# Patient Record
Sex: MALE | Race: WHITE | Employment: OTHER | URBAN - METROPOLITAN AREA
[De-identification: names, ages, dates, MRNs, and addresses within clinical notes are randomized per-mention and may not be internally consistent; named-entity substitution may affect disease eponyms.]

---

## 2017-01-01 ENCOUNTER — OFFICE VISIT (OUTPATIENT)
Dept: OTOLARYNGOLOGY | Facility: CLINIC | Age: 82
End: 2017-01-01

## 2017-01-01 ENCOUNTER — MED REC SCAN ONLY (OUTPATIENT)
Dept: INTERNAL MEDICINE CLINIC | Facility: CLINIC | Age: 82
End: 2017-01-01

## 2017-01-01 ENCOUNTER — OFFICE VISIT (OUTPATIENT)
Dept: INTERNAL MEDICINE CLINIC | Facility: CLINIC | Age: 82
End: 2017-01-01

## 2017-01-01 VITALS
SYSTOLIC BLOOD PRESSURE: 133 MMHG | WEIGHT: 135 LBS | HEIGHT: 66 IN | BODY MASS INDEX: 21.69 KG/M2 | DIASTOLIC BLOOD PRESSURE: 68 MMHG | TEMPERATURE: 97 F

## 2017-01-01 VITALS
BODY MASS INDEX: 20.25 KG/M2 | HEART RATE: 76 BPM | RESPIRATION RATE: 16 BRPM | WEIGHT: 126 LBS | SYSTOLIC BLOOD PRESSURE: 136 MMHG | HEIGHT: 66 IN | DIASTOLIC BLOOD PRESSURE: 88 MMHG

## 2017-01-01 DIAGNOSIS — R63.4 WEIGHT LOSS OF MORE THAN 10% BODY WEIGHT: ICD-10-CM

## 2017-01-01 DIAGNOSIS — H61.23 BILATERAL IMPACTED CERUMEN: Primary | ICD-10-CM

## 2017-01-01 DIAGNOSIS — E55.9 VITAMIN D DEFICIENCY: ICD-10-CM

## 2017-01-01 DIAGNOSIS — I10 ESSENTIAL HYPERTENSION WITH GOAL BLOOD PRESSURE LESS THAN 140/90: Primary | ICD-10-CM

## 2017-01-01 PROCEDURE — 99214 OFFICE O/P EST MOD 30 MIN: CPT | Performed by: INTERNAL MEDICINE

## 2017-01-01 PROCEDURE — G0463 HOSPITAL OUTPT CLINIC VISIT: HCPCS | Performed by: INTERNAL MEDICINE

## 2017-01-01 PROCEDURE — 69210 REMOVE IMPACTED EAR WAX UNI: CPT | Performed by: OTOLARYNGOLOGY

## 2017-01-01 PROCEDURE — 99213 OFFICE O/P EST LOW 20 MIN: CPT | Performed by: OTOLARYNGOLOGY

## 2017-01-01 RX ORDER — HYDROCHLOROTHIAZIDE 25 MG/1
TABLET ORAL
Qty: 90 TABLET | Refills: 0 | Status: SHIPPED | OUTPATIENT
Start: 2017-01-01 | End: 2017-01-01

## 2017-01-01 RX ORDER — HYDROCHLOROTHIAZIDE 25 MG/1
TABLET ORAL
Qty: 90 TABLET | Refills: 3 | Status: SHIPPED | OUTPATIENT
Start: 2017-01-01

## 2017-02-13 ENCOUNTER — OFFICE VISIT (OUTPATIENT)
Dept: OTOLARYNGOLOGY | Facility: CLINIC | Age: 82
End: 2017-02-13

## 2017-02-13 VITALS
TEMPERATURE: 97 F | WEIGHT: 139 LBS | DIASTOLIC BLOOD PRESSURE: 64 MMHG | HEIGHT: 66 IN | BODY MASS INDEX: 22.34 KG/M2 | SYSTOLIC BLOOD PRESSURE: 128 MMHG

## 2017-02-13 DIAGNOSIS — H61.23 BILATERAL IMPACTED CERUMEN: Primary | ICD-10-CM

## 2017-02-13 PROCEDURE — 69210 REMOVE IMPACTED EAR WAX UNI: CPT | Performed by: OTOLARYNGOLOGY

## 2017-02-13 PROCEDURE — 99213 OFFICE O/P EST LOW 20 MIN: CPT | Performed by: OTOLARYNGOLOGY

## 2017-02-13 NOTE — PROGRESS NOTES
Dara Kennedy is a 80year old male. Patient presents with:  Ear Problem: here for ear cleaning        HISTORY OF PRESENT ILLNESS    11/16/15 Here for evaluation ofbilathearing loss.  Patient feels this has worsened over the las weeks and  is not  associated w CARPAL TUNNEL RELEASE Right 2000    COLONOSCOPY & POLYPECTOMY      ELECTROCARDIOGRAM, COMPLETE  10-    Comment SCANNED TO MEDIA TAB: 10-    HEMORRHOIDECTOMY  1977    TONSILLECTOMY      Comment Age [address] Left: Normal. Pupil - Right: Normal, Left: Normal. EOMI. RONAK   Ears Normal  n     Audiogram was not done today       PROCEDURE: After informed consent was obtained, the patients ears were examined under the operating microscope.  Cerumen impaction was mahnaz

## 2017-02-27 ENCOUNTER — OFFICE VISIT (OUTPATIENT)
Dept: INTERNAL MEDICINE CLINIC | Facility: CLINIC | Age: 82
End: 2017-02-27

## 2017-02-27 VITALS
SYSTOLIC BLOOD PRESSURE: 158 MMHG | HEIGHT: 66 IN | OXYGEN SATURATION: 97 % | DIASTOLIC BLOOD PRESSURE: 76 MMHG | TEMPERATURE: 98 F | WEIGHT: 140 LBS | BODY MASS INDEX: 22.5 KG/M2 | HEART RATE: 60 BPM

## 2017-02-27 DIAGNOSIS — J06.9 ACUTE URI: Primary | ICD-10-CM

## 2017-02-27 PROCEDURE — 99213 OFFICE O/P EST LOW 20 MIN: CPT | Performed by: INTERNAL MEDICINE

## 2017-02-27 PROCEDURE — G0463 HOSPITAL OUTPT CLINIC VISIT: HCPCS | Performed by: INTERNAL MEDICINE

## 2017-02-27 RX ORDER — DOXYCYCLINE HYCLATE 50 MG/1
50 CAPSULE ORAL 2 TIMES DAILY
Qty: 14 CAPSULE | Refills: 0 | Status: SHIPPED | OUTPATIENT
Start: 2017-02-27 | End: 2017-03-06

## 2017-02-27 NOTE — PATIENT INSTRUCTIONS
Please take doxycycline twice daily for 7 days. Use warm salt water gargles and Robitussin as needed for throat pain and coughing. Call if no better. Please see Dr. Karrie Jalloh soon because your blood pressure was elevated today.

## 2017-02-27 NOTE — PROGRESS NOTES
Alphonse Carrizales is a 80year old male. Patient presents with:  Cough    HPI:   Since last Monday, 1 week ago, he has had persistent symptoms of slight sore throat and cough productive of clear sputum. No fever.   No nasal congestion, purulent nasal drainage, si erythema without ulceration swelling or exudate  NECK: Supple without mass or lymphadenopathy  LUNGS: Resonant to percussion and clear to auscultation without crackles or wheezes    ASSESSMENT AND PLAN:   1.  Acute URI  Doxycycline 50 mg twice daily ×7 days

## 2017-03-13 RX ORDER — HYDROCHLOROTHIAZIDE 25 MG/1
TABLET ORAL
Qty: 90 TABLET | Refills: 0 | Status: SHIPPED | OUTPATIENT
Start: 2017-03-13 | End: 2017-06-25

## 2017-04-24 ENCOUNTER — TELEPHONE (OUTPATIENT)
Dept: OPHTHALMOLOGY | Facility: CLINIC | Age: 82
End: 2017-04-24

## 2017-04-24 NOTE — TELEPHONE ENCOUNTER
Spoke with patient. He feels like floaters have been \"more active. \"  Appointment was moved to 5/2/17 for a complete exam with BISHOP/maxi

## 2017-04-24 NOTE — TELEPHONE ENCOUNTER
Pt states he feels eyes are getting worse, states seeing spots once in a while, now is more often, starts dark right in middle turns into different colors and diagrams. Pt states he is not having this symptoms right now.  But, would like to be seen sooner t

## 2017-05-02 ENCOUNTER — OFFICE VISIT (OUTPATIENT)
Dept: OPHTHALMOLOGY | Facility: CLINIC | Age: 82
End: 2017-05-02

## 2017-05-02 DIAGNOSIS — H25.13 AGE-RELATED NUCLEAR CATARACT OF BOTH EYES: Primary | ICD-10-CM

## 2017-05-02 DIAGNOSIS — H43.393 FLOATERS, BILATERAL: ICD-10-CM

## 2017-05-02 DIAGNOSIS — H35.363 DRUSEN OF MACULA OF BOTH EYES: ICD-10-CM

## 2017-05-02 PROCEDURE — 92014 COMPRE OPH EXAM EST PT 1/>: CPT | Performed by: OPHTHALMOLOGY

## 2017-05-02 PROCEDURE — 92015 DETERMINE REFRACTIVE STATE: CPT | Performed by: OPHTHALMOLOGY

## 2017-05-02 NOTE — ASSESSMENT & PLAN NOTE
There is no evidence of retinal pathology. All signs and symptoms of retinal detachment/tears explained in detail.     Patient instructed to call the office if they experience increase in floaters, increase in flashes of light, loss of vision or curtain o

## 2017-05-02 NOTE — ASSESSMENT & PLAN NOTE
Discussed early macular degeneration changes in both eyes with the patient. Stressed importance of taking either daily multi-vitamins or over the counter eye vitamins. (any brand is okay).   Recommend eating a healthy diet, sunglasses for eye protection a

## 2017-05-02 NOTE — ASSESSMENT & PLAN NOTE
Discussed cataracts with patient, including options such as surgery or change of glasses RX. Discussed surgical risks, benefits, alternatives and recovery. Patient elects to defer surgery at this time and will try new glasses RX.   If is not satisfied wi

## 2017-05-02 NOTE — PROGRESS NOTES
Mata Rodriguez is a 80year old male. HPI:     HPI     Pt complains of blurred vision and increase of floaters OU x 1 month. Pt would like an updated glasses Rx. Pt denies any flashing lights or vision loss.         Last edited by Edelmira Morel O.T. on Outpatient Prescriptions:  HYDROCHLOROTHIAZIDE 25 MG Oral Tab TAKE 1 TABLET(25 MG) BY MOUTH EVERY DAY Disp: 90 tablet Rfl: 0   aspirin EC 81 MG Oral Tab EC Take 81 mg by mouth daily. Disp:  Rfl:        Allergies:    Amoxicillin                 Comment: Ot Final Rx      Sphere Cylinder Axis Add   Right -3.00 Sphere  +3.00   Left -2.50 +0.50 15 +3.00       Type:  Flat top bifocal                 ASSESSMENT/PLAN:     Diagnoses and Plan:     Drusen of macula of both eyes  Discussed early macular degenerati

## 2017-05-02 NOTE — PATIENT INSTRUCTIONS
Drusen of macula of both eyes  Discussed early macular degeneration changes in both eyes with the patient. Stressed importance of taking either daily multi-vitamins or over the counter eye vitamins. (any brand is okay).   Recommend eating a healthy diet,

## 2017-06-12 ENCOUNTER — OFFICE VISIT (OUTPATIENT)
Dept: CARDIOLOGY CLINIC | Facility: CLINIC | Age: 82
End: 2017-06-12

## 2017-06-12 VITALS
WEIGHT: 133 LBS | BODY MASS INDEX: 21 KG/M2 | HEART RATE: 80 BPM | DIASTOLIC BLOOD PRESSURE: 70 MMHG | SYSTOLIC BLOOD PRESSURE: 120 MMHG

## 2017-06-12 DIAGNOSIS — I34.0 MITRAL VALVE INSUFFICIENCY, UNSPECIFIED ETIOLOGY: ICD-10-CM

## 2017-06-12 DIAGNOSIS — I35.0 AORTIC VALVE STENOSIS, UNSPECIFIED ETIOLOGY: Primary | ICD-10-CM

## 2017-06-12 PROCEDURE — G0463 HOSPITAL OUTPT CLINIC VISIT: HCPCS | Performed by: INTERNAL MEDICINE

## 2017-06-12 PROCEDURE — 99214 OFFICE O/P EST MOD 30 MIN: CPT | Performed by: INTERNAL MEDICINE

## 2017-06-12 NOTE — PROGRESS NOTES
Carmenza Hernandez is a 80year old male. Patient presents with: Follow - Up    HPI:   This patient has mild to moderate mitral insufficiency and aortic stenosis.   He feels extremely well with no symptomatology    Current Outpatient Prescriptions:  HYDROCHLOROTHIA asymptomatic no need for further workup right now  The patient indicates understanding of these issues and agrees to the plan. The patient is asked to return in in 1 year.              Kyrie Bucio MD  6/12/2017  1:29 PM

## 2017-06-20 ENCOUNTER — OFFICE VISIT (OUTPATIENT)
Dept: INTERNAL MEDICINE CLINIC | Facility: CLINIC | Age: 82
End: 2017-06-20

## 2017-06-20 VITALS
HEIGHT: 65 IN | SYSTOLIC BLOOD PRESSURE: 171 MMHG | RESPIRATION RATE: 16 BRPM | HEART RATE: 63 BPM | WEIGHT: 133 LBS | BODY MASS INDEX: 22.16 KG/M2 | DIASTOLIC BLOOD PRESSURE: 81 MMHG

## 2017-06-20 DIAGNOSIS — I10 ESSENTIAL HYPERTENSION: Primary | ICD-10-CM

## 2017-06-20 DIAGNOSIS — I35.0 AORTIC VALVE STENOSIS, UNSPECIFIED ETIOLOGY: ICD-10-CM

## 2017-06-20 DIAGNOSIS — E55.9 VITAMIN D DEFICIENCY: ICD-10-CM

## 2017-06-20 PROCEDURE — 99214 OFFICE O/P EST MOD 30 MIN: CPT | Performed by: INTERNAL MEDICINE

## 2017-06-20 PROCEDURE — G0463 HOSPITAL OUTPT CLINIC VISIT: HCPCS | Performed by: INTERNAL MEDICINE

## 2017-06-20 NOTE — PROGRESS NOTES
Tarah Rodriguez is a 80year old male. HPI:   1. Essential hypertension with goal blood pressure less than 130/85    Patient has been following low salt diet and has been taking anti-hypertensive prescriptions as prescribed.  Blood pressure has been checked and (1.651 m)  Wt 133 lb (60.328 kg)  BMI 22.13 kg/m2     GENERAL: well developed, well nourished,in no apparent distress  SKIN: no rashes,no suspicious lesions  HEENT: atraumatic, normocephalic,ears and throat are clear  NECK: supple,no adenopathy,no bruits

## 2017-06-26 ENCOUNTER — OFFICE VISIT (OUTPATIENT)
Dept: OTOLARYNGOLOGY | Facility: CLINIC | Age: 82
End: 2017-06-26

## 2017-06-26 VITALS — HEIGHT: 66 IN | WEIGHT: 133 LBS | BODY MASS INDEX: 21.38 KG/M2 | TEMPERATURE: 96 F

## 2017-06-26 DIAGNOSIS — H61.23 BILATERAL IMPACTED CERUMEN: Primary | ICD-10-CM

## 2017-06-26 PROCEDURE — 69210 REMOVE IMPACTED EAR WAX UNI: CPT | Performed by: OTOLARYNGOLOGY

## 2017-06-26 PROCEDURE — 99213 OFFICE O/P EST LOW 20 MIN: CPT | Performed by: OTOLARYNGOLOGY

## 2017-06-26 RX ORDER — HYDROCHLOROTHIAZIDE 25 MG/1
TABLET ORAL
Qty: 90 TABLET | Refills: 0 | Status: SHIPPED | OUTPATIENT
Start: 2017-06-26 | End: 2017-01-01

## 2017-06-26 NOTE — PROGRESS NOTES
Natasha Covarrubias is a 80year old male. Patient presents with:  Ear Problem: Patient RTC c/o bilateral clogged ears        HISTORY OF PRESENT ILLNESS    11/16/15 Here for evaluation ofbilathearing loss.  Patient feels this has worsened over the las weeks and  is Other and unspecified hyperlipidemia    • Unspecified essential hypertension 2007   • Vitreous floaters of right eye 2011    OD     Past Surgical History:  2000: CARPAL TUNNEL RELEASE Right  1994: COLONOSCOPY  1996: COLONOSCOPY  2000: COLONOSCOPY  2005: CO affect. Lymph Detail Normal  normal to inspection ear canals are nl tympanic membranes are  nl   Eyes Normal Conjunctiva - Right: Normal, Left: Normal. Pupil - Right: Normal, Left: Normal. EOMI.  RONAK   Ears Normal  n     Audiogram was not done today

## 2017-11-27 NOTE — PROGRESS NOTES
Marilu Vera is a 80year old male. Patient presents with:  Ear Wax: bilateral ear cleaning         HISTORY OF PRESENT ILLNESS    11/16/15 Here for evaluation ofbilathearing loss.  Patient feels this has worsened over the las weeks and  is not  associated wit Hypertensive retinopathy of left eye 2011, 07-    OS   • Other and unspecified hyperlipidemia    • Unspecified essential hypertension 2007   • Vitreous floaters of right eye 2011    OD     Past Surgical History:  2000: CARPAL TUNNEL RELEASE Right  1 Normal. Cranial nerves - Cranial nerves II through XII grossly intact. Neck Exam Normal  normal to inspection   Psychiatric Normal   Appropriate mood and affect.    Lymph Detail Normal  normal to inspection ear canals are nl tympanic membranes are  nl   E

## 2017-12-27 NOTE — PROGRESS NOTES
Marguerite Caldera is a 80year old male. HPI:   1. Essential hypertension with goal blood pressure less than 130/85    Patient has been following low salt diet and has been taking anti-hypertensive prescriptions as prescribed.  Blood pressure has been checked and feels well otherwise  SKIN: denies any unusual skin lesions or rashes  RESPIRATORY: denies shortness of breath with exertion  CARDIOVASCULAR: denies chest pain on exertion  GI: denies abdominal pain and denies heartburn  NEURO: denies headaches    EXAM:

## 2018-01-01 ENCOUNTER — TELEPHONE (OUTPATIENT)
Dept: INTERNAL MEDICINE CLINIC | Facility: CLINIC | Age: 83
End: 2018-01-01

## 2018-01-01 ENCOUNTER — ANESTHESIA (OUTPATIENT)
Dept: ENDOSCOPY | Facility: HOSPITAL | Age: 83
End: 2018-01-01
Payer: MEDICARE

## 2018-01-01 ENCOUNTER — APPOINTMENT (OUTPATIENT)
Dept: CV DIAGNOSTICS | Facility: HOSPITAL | Age: 83
DRG: 246 | End: 2018-01-01
Attending: HOSPITALIST
Payer: MEDICARE

## 2018-01-01 ENCOUNTER — APPOINTMENT (OUTPATIENT)
Dept: LAB | Age: 83
End: 2018-01-01
Attending: INTERNAL MEDICINE
Payer: MEDICARE

## 2018-01-01 ENCOUNTER — APPOINTMENT (OUTPATIENT)
Dept: INTERVENTIONAL RADIOLOGY/VASCULAR | Facility: HOSPITAL | Age: 83
DRG: 246 | End: 2018-01-01
Attending: NURSE PRACTITIONER
Payer: MEDICARE

## 2018-01-01 ENCOUNTER — LAB ENCOUNTER (OUTPATIENT)
Dept: LAB | Age: 83
End: 2018-01-01
Attending: INTERNAL MEDICINE
Payer: MEDICARE

## 2018-01-01 ENCOUNTER — NURSE TRIAGE (OUTPATIENT)
Dept: OTHER | Age: 83
End: 2018-01-01

## 2018-01-01 ENCOUNTER — APPOINTMENT (OUTPATIENT)
Dept: CV DIAGNOSTICS | Facility: HOSPITAL | Age: 83
DRG: 246 | End: 2018-01-01
Attending: INTERNAL MEDICINE
Payer: MEDICARE

## 2018-01-01 ENCOUNTER — OFFICE VISIT (OUTPATIENT)
Dept: INTERNAL MEDICINE CLINIC | Facility: CLINIC | Age: 83
End: 2018-01-01

## 2018-01-01 ENCOUNTER — OFFICE VISIT (OUTPATIENT)
Dept: PODIATRY CLINIC | Facility: CLINIC | Age: 83
End: 2018-01-01

## 2018-01-01 ENCOUNTER — TELEPHONE (OUTPATIENT)
Dept: ADMINISTRATIVE | Age: 83
End: 2018-01-01

## 2018-01-01 ENCOUNTER — APPOINTMENT (OUTPATIENT)
Dept: ULTRASOUND IMAGING | Facility: HOSPITAL | Age: 83
DRG: 246 | End: 2018-01-01
Attending: NURSE PRACTITIONER
Payer: MEDICARE

## 2018-01-01 ENCOUNTER — APPOINTMENT (OUTPATIENT)
Dept: INTERVENTIONAL RADIOLOGY/VASCULAR | Facility: HOSPITAL | Age: 83
DRG: 246 | End: 2018-01-01
Attending: INTERNAL MEDICINE
Payer: MEDICARE

## 2018-01-01 ENCOUNTER — APPOINTMENT (OUTPATIENT)
Dept: MRI IMAGING | Facility: HOSPITAL | Age: 83
DRG: 246 | End: 2018-01-01
Attending: Other
Payer: MEDICARE

## 2018-01-01 ENCOUNTER — APPOINTMENT (OUTPATIENT)
Dept: LAB | Age: 83
End: 2018-01-01
Attending: NURSE PRACTITIONER
Payer: MEDICARE

## 2018-01-01 ENCOUNTER — TELEPHONE (OUTPATIENT)
Dept: OTHER | Age: 83
End: 2018-01-01

## 2018-01-01 ENCOUNTER — HOSPITAL ENCOUNTER (INPATIENT)
Facility: HOSPITAL | Age: 83
LOS: 9 days | Discharge: HOME HEALTH | DRG: 246 | End: 2018-01-01
Attending: EMERGENCY MEDICINE | Admitting: HOSPITALIST
Payer: MEDICARE

## 2018-01-01 ENCOUNTER — HOSPITAL ENCOUNTER (OUTPATIENT)
Facility: HOSPITAL | Age: 83
Setting detail: HOSPITAL OUTPATIENT SURGERY
Discharge: HOME | End: 2018-01-01
Attending: INTERNAL MEDICINE | Admitting: INTERNAL MEDICINE
Payer: MEDICARE

## 2018-01-01 ENCOUNTER — TELEPHONE (OUTPATIENT)
Dept: CARDIOLOGY CLINIC | Facility: CLINIC | Age: 83
End: 2018-01-01

## 2018-01-01 ENCOUNTER — APPOINTMENT (OUTPATIENT)
Dept: NUCLEAR MEDICINE | Facility: HOSPITAL | Age: 83
DRG: 246 | End: 2018-01-01
Attending: INTERNAL MEDICINE
Payer: MEDICARE

## 2018-01-01 ENCOUNTER — APPOINTMENT (OUTPATIENT)
Dept: CT IMAGING | Facility: HOSPITAL | Age: 83
DRG: 246 | End: 2018-01-01
Attending: HOSPITALIST
Payer: MEDICARE

## 2018-01-01 ENCOUNTER — OFFICE VISIT (OUTPATIENT)
Dept: OTOLARYNGOLOGY | Facility: CLINIC | Age: 83
End: 2018-01-01

## 2018-01-01 ENCOUNTER — OFFICE VISIT (OUTPATIENT)
Dept: CARDIOLOGY CLINIC | Facility: CLINIC | Age: 83
End: 2018-01-01

## 2018-01-01 ENCOUNTER — TELEPHONE (OUTPATIENT)
Dept: INTERNAL MEDICINE UNIT | Facility: HOSPITAL | Age: 83
End: 2018-01-01

## 2018-01-01 ENCOUNTER — SURGERY (OUTPATIENT)
Age: 83
End: 2018-01-01

## 2018-01-01 ENCOUNTER — PATIENT OUTREACH (OUTPATIENT)
Dept: CASE MANAGEMENT | Age: 83
End: 2018-01-01

## 2018-01-01 ENCOUNTER — APPOINTMENT (OUTPATIENT)
Dept: GENERAL RADIOLOGY | Facility: HOSPITAL | Age: 83
DRG: 246 | End: 2018-01-01
Attending: EMERGENCY MEDICINE
Payer: MEDICARE

## 2018-01-01 ENCOUNTER — TELEPHONE (OUTPATIENT)
Dept: OPHTHALMOLOGY | Facility: CLINIC | Age: 83
End: 2018-01-01

## 2018-01-01 ENCOUNTER — ANESTHESIA EVENT (OUTPATIENT)
Dept: ENDOSCOPY | Facility: HOSPITAL | Age: 83
End: 2018-01-01
Payer: MEDICARE

## 2018-01-01 ENCOUNTER — TELEPHONE (OUTPATIENT)
Dept: FAMILY MEDICINE CLINIC | Facility: CLINIC | Age: 83
End: 2018-01-01

## 2018-01-01 VITALS
SYSTOLIC BLOOD PRESSURE: 140 MMHG | TEMPERATURE: 98 F | BODY MASS INDEX: 18.64 KG/M2 | DIASTOLIC BLOOD PRESSURE: 80 MMHG | WEIGHT: 116 LBS | HEIGHT: 66 IN

## 2018-01-01 VITALS
HEART RATE: 79 BPM | SYSTOLIC BLOOD PRESSURE: 127 MMHG | RESPIRATION RATE: 16 BRPM | WEIGHT: 120 LBS | DIASTOLIC BLOOD PRESSURE: 76 MMHG | BODY MASS INDEX: 19 KG/M2

## 2018-01-01 VITALS
RESPIRATION RATE: 16 BRPM | SYSTOLIC BLOOD PRESSURE: 155 MMHG | HEIGHT: 66 IN | DIASTOLIC BLOOD PRESSURE: 88 MMHG | WEIGHT: 104 LBS | BODY MASS INDEX: 16.71 KG/M2 | HEART RATE: 83 BPM

## 2018-01-01 VITALS
HEART RATE: 76 BPM | WEIGHT: 125 LBS | DIASTOLIC BLOOD PRESSURE: 71 MMHG | BODY MASS INDEX: 20.09 KG/M2 | SYSTOLIC BLOOD PRESSURE: 121 MMHG | HEIGHT: 66 IN | RESPIRATION RATE: 16 BRPM

## 2018-01-01 VITALS
SYSTOLIC BLOOD PRESSURE: 135 MMHG | DIASTOLIC BLOOD PRESSURE: 67 MMHG | HEIGHT: 66 IN | HEART RATE: 60 BPM | WEIGHT: 98 LBS | OXYGEN SATURATION: 96 % | RESPIRATION RATE: 22 BRPM | BODY MASS INDEX: 15.75 KG/M2

## 2018-01-01 VITALS
HEIGHT: 66 IN | SYSTOLIC BLOOD PRESSURE: 138 MMHG | WEIGHT: 106 LBS | HEART RATE: 76 BPM | RESPIRATION RATE: 16 BRPM | DIASTOLIC BLOOD PRESSURE: 68 MMHG | BODY MASS INDEX: 17.04 KG/M2

## 2018-01-01 VITALS
WEIGHT: 121 LBS | HEART RATE: 72 BPM | SYSTOLIC BLOOD PRESSURE: 122 MMHG | DIASTOLIC BLOOD PRESSURE: 62 MMHG | BODY MASS INDEX: 20 KG/M2 | RESPIRATION RATE: 12 BRPM

## 2018-01-01 VITALS
BODY MASS INDEX: 16 KG/M2 | SYSTOLIC BLOOD PRESSURE: 120 MMHG | RESPIRATION RATE: 16 BRPM | WEIGHT: 100 LBS | HEART RATE: 72 BPM | DIASTOLIC BLOOD PRESSURE: 80 MMHG

## 2018-01-01 VITALS
RESPIRATION RATE: 12 BRPM | WEIGHT: 110 LBS | BODY MASS INDEX: 18 KG/M2 | OXYGEN SATURATION: 96 % | HEART RATE: 112 BPM | SYSTOLIC BLOOD PRESSURE: 122 MMHG | DIASTOLIC BLOOD PRESSURE: 68 MMHG

## 2018-01-01 VITALS
DIASTOLIC BLOOD PRESSURE: 66 MMHG | HEART RATE: 58 BPM | WEIGHT: 117.19 LBS | SYSTOLIC BLOOD PRESSURE: 118 MMHG | TEMPERATURE: 98 F | RESPIRATION RATE: 16 BRPM | OXYGEN SATURATION: 98 % | BODY MASS INDEX: 18.84 KG/M2 | HEIGHT: 66 IN

## 2018-01-01 VITALS
WEIGHT: 116 LBS | HEART RATE: 80 BPM | SYSTOLIC BLOOD PRESSURE: 126 MMHG | RESPIRATION RATE: 16 BRPM | DIASTOLIC BLOOD PRESSURE: 75 MMHG | BODY MASS INDEX: 19 KG/M2

## 2018-01-01 DIAGNOSIS — E55.9 VITAMIN D DEFICIENCY: ICD-10-CM

## 2018-01-01 DIAGNOSIS — R42 DIZZINESS: ICD-10-CM

## 2018-01-01 DIAGNOSIS — R06.02 SOB (SHORTNESS OF BREATH): Primary | ICD-10-CM

## 2018-01-01 DIAGNOSIS — E87.1 LOW SODIUM LEVELS: ICD-10-CM

## 2018-01-01 DIAGNOSIS — R63.4 WEIGHT LOSS: ICD-10-CM

## 2018-01-01 DIAGNOSIS — I10 HYPERTENSION, UNSPECIFIED TYPE: ICD-10-CM

## 2018-01-01 DIAGNOSIS — I35.0 AORTIC VALVE STENOSIS, ETIOLOGY OF CARDIAC VALVE DISEASE UNSPECIFIED: ICD-10-CM

## 2018-01-01 DIAGNOSIS — R94.31 ABNORMAL EKG: Primary | ICD-10-CM

## 2018-01-01 DIAGNOSIS — E87.1 LOW SODIUM LEVELS: Primary | ICD-10-CM

## 2018-01-01 DIAGNOSIS — I10 HYPERTENSION, UNSPECIFIED TYPE: Primary | ICD-10-CM

## 2018-01-01 DIAGNOSIS — E87.1 HYPONATREMIA: ICD-10-CM

## 2018-01-01 DIAGNOSIS — I10 ESSENTIAL HYPERTENSION WITH GOAL BLOOD PRESSURE LESS THAN 140/90: ICD-10-CM

## 2018-01-01 DIAGNOSIS — R63.4 WEIGHT LOSS OF MORE THAN 10% BODY WEIGHT: ICD-10-CM

## 2018-01-01 DIAGNOSIS — J45.21 ASTHMATIC BRONCHITIS, MILD INTERMITTENT, WITH ACUTE EXACERBATION: Primary | ICD-10-CM

## 2018-01-01 DIAGNOSIS — I25.10 CORONARY ARTERY DISEASE INVOLVING NATIVE CORONARY ARTERY OF NATIVE HEART WITHOUT ANGINA PECTORIS: Primary | ICD-10-CM

## 2018-01-01 DIAGNOSIS — B35.1 ONYCHOMYCOSIS: ICD-10-CM

## 2018-01-01 DIAGNOSIS — I10 ESSENTIAL HYPERTENSION WITH GOAL BLOOD PRESSURE LESS THAN 140/90: Primary | ICD-10-CM

## 2018-01-01 DIAGNOSIS — R13.10 DYSPHAGIA, UNSPECIFIED TYPE: ICD-10-CM

## 2018-01-01 DIAGNOSIS — I63.89 CEREBROVASCULAR ACCIDENT (CVA) DUE TO OTHER MECHANISM (HCC): Primary | ICD-10-CM

## 2018-01-01 DIAGNOSIS — R06.00 DYSPNEA, UNSPECIFIED TYPE: Primary | ICD-10-CM

## 2018-01-01 DIAGNOSIS — R06.00 DYSPNEA, UNSPECIFIED TYPE: ICD-10-CM

## 2018-01-01 DIAGNOSIS — I34.0 MITRAL VALVE INSUFFICIENCY, UNSPECIFIED ETIOLOGY: ICD-10-CM

## 2018-01-01 DIAGNOSIS — M79.674 PAIN IN TOES OF BOTH FEET: Primary | ICD-10-CM

## 2018-01-01 DIAGNOSIS — I25.10 CORONARY ARTERY DISEASE INVOLVING NATIVE CORONARY ARTERY OF NATIVE HEART WITHOUT ANGINA PECTORIS: ICD-10-CM

## 2018-01-01 DIAGNOSIS — I35.0 AORTIC VALVE STENOSIS, ETIOLOGY OF CARDIAC VALVE DISEASE UNSPECIFIED: Primary | ICD-10-CM

## 2018-01-01 DIAGNOSIS — E78.2 MIXED HYPERLIPIDEMIA: ICD-10-CM

## 2018-01-01 DIAGNOSIS — H61.23 BILATERAL IMPACTED CERUMEN: ICD-10-CM

## 2018-01-01 DIAGNOSIS — M79.675 PAIN IN TOES OF BOTH FEET: Primary | ICD-10-CM

## 2018-01-01 LAB
ALBUMIN SERPL BCP-MCNC: 3.9 G/DL (ref 3.5–4.8)
ALBUMIN/GLOB SERPL: 1.3 {RATIO} (ref 1–2)
ALP SERPL-CCNC: 68 U/L (ref 32–100)
ALT SERPL-CCNC: 18 U/L (ref 17–63)
ANION GAP SERPL CALC-SCNC: 11 MMOL/L (ref 0–18)
ANION GAP SERPL CALC-SCNC: 13 MMOL/L (ref 0–18)
ANION GAP SERPL CALC-SCNC: 4 MMOL/L (ref 0–18)
ANION GAP SERPL CALC-SCNC: 5 MMOL/L (ref 0–18)
ANION GAP SERPL CALC-SCNC: 6 MMOL/L (ref 0–18)
ANION GAP SERPL CALC-SCNC: 7 MMOL/L (ref 0–18)
ANION GAP SERPL CALC-SCNC: 7 MMOL/L (ref 0–18)
ANION GAP SERPL CALC-SCNC: 8 MMOL/L (ref 0–18)
ANION GAP SERPL CALC-SCNC: 8 MMOL/L (ref 0–18)
ANION GAP SERPL CALC-SCNC: 9 MMOL/L (ref 0–18)
APTT PPP: 32.8 SECONDS (ref 23.2–35.3)
AST SERPL-CCNC: 26 U/L (ref 15–41)
BASOPHILS # BLD: 0 K/UL (ref 0–0.2)
BASOPHILS # BLD: 0.1 K/UL (ref 0–0.2)
BASOPHILS NFR BLD: 0 %
BASOPHILS NFR BLD: 1 %
BILIRUB SERPL-MCNC: 1.1 MG/DL (ref 0.3–1.2)
BILIRUB UR QL: NEGATIVE
BUN SERPL-MCNC: 10 MG/DL (ref 8–20)
BUN SERPL-MCNC: 10 MG/DL (ref 8–20)
BUN SERPL-MCNC: 12 MG/DL (ref 8–20)
BUN SERPL-MCNC: 14 MG/DL (ref 8–20)
BUN SERPL-MCNC: 8 MG/DL (ref 8–20)
BUN SERPL-MCNC: 8 MG/DL (ref 8–20)
BUN SERPL-MCNC: 9 MG/DL (ref 8–20)
BUN/CREAT SERPL: 10.8 (ref 10–20)
BUN/CREAT SERPL: 11.4 (ref 10–20)
BUN/CREAT SERPL: 12 (ref 10–20)
BUN/CREAT SERPL: 12.2 (ref 10–20)
BUN/CREAT SERPL: 13.4 (ref 10–20)
BUN/CREAT SERPL: 14.3 (ref 10–20)
BUN/CREAT SERPL: 15.2 (ref 10–20)
BUN/CREAT SERPL: 15.3 (ref 10–20)
BUN/CREAT SERPL: 17.4 (ref 10–20)
BUN/CREAT SERPL: 20.3 (ref 10–20)
CALCIUM SERPL-MCNC: 8.5 MG/DL (ref 8.5–10.5)
CALCIUM SERPL-MCNC: 8.7 MG/DL (ref 8.5–10.5)
CALCIUM SERPL-MCNC: 8.7 MG/DL (ref 8.5–10.5)
CALCIUM SERPL-MCNC: 8.8 MG/DL (ref 8.5–10.5)
CALCIUM SERPL-MCNC: 8.9 MG/DL (ref 8.5–10.5)
CALCIUM SERPL-MCNC: 8.9 MG/DL (ref 8.5–10.5)
CALCIUM SERPL-MCNC: 9 MG/DL (ref 8.5–10.5)
CALCIUM SERPL-MCNC: 9.1 MG/DL (ref 8.5–10.5)
CALCIUM SERPL-MCNC: 9.6 MG/DL (ref 8.5–10.5)
CALCIUM SERPL-MCNC: 9.9 MG/DL (ref 8.5–10.5)
CHLORIDE SERPL-SCNC: 100 MMOL/L (ref 95–110)
CHLORIDE SERPL-SCNC: 102 MMOL/L (ref 95–110)
CHLORIDE SERPL-SCNC: 89 MMOL/L (ref 95–110)
CHLORIDE SERPL-SCNC: 90 MMOL/L (ref 95–110)
CHLORIDE SERPL-SCNC: 92 MMOL/L (ref 95–110)
CHLORIDE SERPL-SCNC: 95 MMOL/L (ref 95–110)
CHLORIDE SERPL-SCNC: 97 MMOL/L (ref 95–110)
CHLORIDE SERPL-SCNC: 97 MMOL/L (ref 95–110)
CHLORIDE SERPL-SCNC: 98 MMOL/L (ref 95–110)
CHLORIDE SERPL-SCNC: 98 MMOL/L (ref 95–110)
CHOLEST SERPL-MCNC: 124 MG/DL (ref 110–200)
CLARITY UR: CLEAR
CO2 SERPL-SCNC: 22 MMOL/L (ref 22–32)
CO2 SERPL-SCNC: 25 MMOL/L (ref 22–32)
CO2 SERPL-SCNC: 26 MMOL/L (ref 22–32)
CO2 SERPL-SCNC: 27 MMOL/L (ref 22–32)
CO2 SERPL-SCNC: 28 MMOL/L (ref 22–32)
CO2 SERPL-SCNC: 28 MMOL/L (ref 22–32)
COLOR UR: YELLOW
CREAT SERPL-MCNC: 0.59 MG/DL (ref 0.5–1.5)
CREAT SERPL-MCNC: 0.63 MG/DL (ref 0.5–1.5)
CREAT SERPL-MCNC: 0.66 MG/DL (ref 0.5–1.5)
CREAT SERPL-MCNC: 0.67 MG/DL (ref 0.5–1.5)
CREAT SERPL-MCNC: 0.69 MG/DL (ref 0.5–1.5)
CREAT SERPL-MCNC: 0.69 MG/DL (ref 0.5–1.5)
CREAT SERPL-MCNC: 0.7 MG/DL (ref 0.5–1.5)
CREAT SERPL-MCNC: 0.74 MG/DL (ref 0.5–1.5)
CREAT SERPL-MCNC: 0.75 MG/DL (ref 0.5–1.5)
CREAT SERPL-MCNC: 0.82 MG/DL (ref 0.5–1.5)
EOSINOPHIL # BLD: 0 K/UL (ref 0–0.7)
EOSINOPHIL # BLD: 0 K/UL (ref 0–0.7)
EOSINOPHIL # BLD: 0.1 K/UL (ref 0–0.7)
EOSINOPHIL # BLD: 0.2 K/UL (ref 0–0.7)
EOSINOPHIL # BLD: 0.4 K/UL (ref 0–0.7)
EOSINOPHIL NFR BLD: 0 %
EOSINOPHIL NFR BLD: 0 %
EOSINOPHIL NFR BLD: 1 %
EOSINOPHIL NFR BLD: 2 %
EOSINOPHIL NFR BLD: 4 %
ERYTHROCYTE [DISTWIDTH] IN BLOOD BY AUTOMATED COUNT: 12.6 % (ref 11–15)
ERYTHROCYTE [DISTWIDTH] IN BLOOD BY AUTOMATED COUNT: 12.7 % (ref 11–15)
ERYTHROCYTE [DISTWIDTH] IN BLOOD BY AUTOMATED COUNT: 12.9 % (ref 11–15)
ERYTHROCYTE [DISTWIDTH] IN BLOOD BY AUTOMATED COUNT: 13 % (ref 11–15)
ERYTHROCYTE [DISTWIDTH] IN BLOOD BY AUTOMATED COUNT: 13.1 % (ref 11–15)
ERYTHROCYTE [DISTWIDTH] IN BLOOD BY AUTOMATED COUNT: 13.2 % (ref 11–15)
ERYTHROCYTE [DISTWIDTH] IN BLOOD BY AUTOMATED COUNT: 13.3 % (ref 11–15)
ERYTHROCYTE [DISTWIDTH] IN BLOOD BY AUTOMATED COUNT: 13.3 % (ref 11–15)
GLOBULIN PLAS-MCNC: 3 G/DL (ref 2.5–3.7)
GLUCOSE BLDC GLUCOMTR-MCNC: 101 MG/DL (ref 70–99)
GLUCOSE BLDC GLUCOMTR-MCNC: 106 MG/DL (ref 70–99)
GLUCOSE BLDC GLUCOMTR-MCNC: 106 MG/DL (ref 70–99)
GLUCOSE BLDC GLUCOMTR-MCNC: 119 MG/DL (ref 70–99)
GLUCOSE BLDC GLUCOMTR-MCNC: 139 MG/DL (ref 70–99)
GLUCOSE BLDC GLUCOMTR-MCNC: 98 MG/DL (ref 70–99)
GLUCOSE SERPL-MCNC: 113 MG/DL (ref 70–99)
GLUCOSE SERPL-MCNC: 114 MG/DL (ref 70–99)
GLUCOSE SERPL-MCNC: 187 MG/DL (ref 70–99)
GLUCOSE SERPL-MCNC: 85 MG/DL (ref 70–99)
GLUCOSE SERPL-MCNC: 88 MG/DL (ref 70–99)
GLUCOSE SERPL-MCNC: 90 MG/DL (ref 70–99)
GLUCOSE SERPL-MCNC: 93 MG/DL (ref 70–99)
GLUCOSE SERPL-MCNC: 94 MG/DL (ref 70–99)
GLUCOSE SERPL-MCNC: 95 MG/DL (ref 70–99)
GLUCOSE SERPL-MCNC: 98 MG/DL (ref 70–99)
GLUCOSE UR-MCNC: NEGATIVE MG/DL
HCT VFR BLD AUTO: 32.5 % (ref 41–52)
HCT VFR BLD AUTO: 33.2 % (ref 41–52)
HCT VFR BLD AUTO: 34.8 % (ref 41–52)
HCT VFR BLD AUTO: 35.1 % (ref 41–52)
HCT VFR BLD AUTO: 35.5 % (ref 41–52)
HCT VFR BLD AUTO: 35.8 % (ref 41–52)
HCT VFR BLD AUTO: 36.5 % (ref 41–52)
HCT VFR BLD AUTO: 37.7 % (ref 41–52)
HDLC SERPL-MCNC: 47 MG/DL
HEMOCCULT STL QL: NEGATIVE
HGB BLD-MCNC: 11.1 G/DL (ref 13.5–17.5)
HGB BLD-MCNC: 11.2 G/DL (ref 13.5–17.5)
HGB BLD-MCNC: 11.8 G/DL (ref 13.5–17.5)
HGB BLD-MCNC: 11.9 G/DL (ref 13.5–17.5)
HGB BLD-MCNC: 11.9 G/DL (ref 13.5–17.5)
HGB BLD-MCNC: 12 G/DL (ref 13.5–17.5)
HGB BLD-MCNC: 12.5 G/DL (ref 13.5–17.5)
HGB BLD-MCNC: 12.7 G/DL (ref 13.5–17.5)
HGB UR QL STRIP.AUTO: NEGATIVE
INR BLD: 1 (ref 0.9–1.2)
KETONES UR-MCNC: NEGATIVE MG/DL
LDLC SERPL CALC-MCNC: 62 MG/DL (ref 0–99)
LEUKOCYTE ESTERASE UR QL STRIP.AUTO: NEGATIVE
LYMPHOCYTES # BLD: 1.6 K/UL (ref 1–4)
LYMPHOCYTES # BLD: 1.7 K/UL (ref 1–4)
LYMPHOCYTES # BLD: 2.1 K/UL (ref 1–4)
LYMPHOCYTES # BLD: 2.2 K/UL (ref 1–4)
LYMPHOCYTES # BLD: 2.2 K/UL (ref 1–4)
LYMPHOCYTES # BLD: 2.4 K/UL (ref 1–4)
LYMPHOCYTES # BLD: 2.4 K/UL (ref 1–4)
LYMPHOCYTES # BLD: 2.5 K/UL (ref 1–4)
LYMPHOCYTES NFR BLD: 10 %
LYMPHOCYTES NFR BLD: 12 %
LYMPHOCYTES NFR BLD: 16 %
LYMPHOCYTES NFR BLD: 17 %
LYMPHOCYTES NFR BLD: 20 %
LYMPHOCYTES NFR BLD: 20 %
LYMPHOCYTES NFR BLD: 21 %
LYMPHOCYTES NFR BLD: 21 %
MAGNESIUM SERPL-MCNC: 1.5 MG/DL (ref 1.8–2.5)
MAGNESIUM SERPL-MCNC: 1.6 MG/DL (ref 1.8–2.5)
MAGNESIUM SERPL-MCNC: 1.7 MG/DL (ref 1.8–2.5)
MCH RBC QN AUTO: 29.4 PG (ref 27–32)
MCH RBC QN AUTO: 29.5 PG (ref 27–32)
MCH RBC QN AUTO: 29.5 PG (ref 27–32)
MCH RBC QN AUTO: 29.6 PG (ref 27–32)
MCH RBC QN AUTO: 29.6 PG (ref 27–32)
MCH RBC QN AUTO: 29.7 PG (ref 27–32)
MCH RBC QN AUTO: 29.7 PG (ref 27–32)
MCH RBC QN AUTO: 30.1 PG (ref 27–32)
MCHC RBC AUTO-ENTMCNC: 33.6 G/DL (ref 32–37)
MCHC RBC AUTO-ENTMCNC: 33.6 G/DL (ref 32–37)
MCHC RBC AUTO-ENTMCNC: 33.7 G/DL (ref 32–37)
MCHC RBC AUTO-ENTMCNC: 33.8 G/DL (ref 32–37)
MCHC RBC AUTO-ENTMCNC: 33.9 G/DL (ref 32–37)
MCHC RBC AUTO-ENTMCNC: 33.9 G/DL (ref 32–37)
MCHC RBC AUTO-ENTMCNC: 34.2 G/DL (ref 32–37)
MCHC RBC AUTO-ENTMCNC: 34.2 G/DL (ref 32–37)
MCV RBC AUTO: 86.4 FL (ref 80–100)
MCV RBC AUTO: 87.1 FL (ref 80–100)
MCV RBC AUTO: 87.3 FL (ref 80–100)
MCV RBC AUTO: 87.5 FL (ref 80–100)
MCV RBC AUTO: 87.8 FL (ref 80–100)
MCV RBC AUTO: 87.9 FL (ref 80–100)
MCV RBC AUTO: 87.9 FL (ref 80–100)
MCV RBC AUTO: 88.2 FL (ref 80–100)
MONOCYTES # BLD: 0.4 K/UL (ref 0–1)
MONOCYTES # BLD: 0.8 K/UL (ref 0–1)
MONOCYTES # BLD: 0.9 K/UL (ref 0–1)
MONOCYTES # BLD: 0.9 K/UL (ref 0–1)
MONOCYTES # BLD: 1.1 K/UL (ref 0–1)
MONOCYTES # BLD: 1.1 K/UL (ref 0–1)
MONOCYTES # BLD: 1.2 K/UL (ref 0–1)
MONOCYTES # BLD: 1.5 K/UL (ref 0–1)
MONOCYTES NFR BLD: 10 %
MONOCYTES NFR BLD: 3 %
MONOCYTES NFR BLD: 8 %
MONOCYTES NFR BLD: 9 %
NEUTROPHILS # BLD AUTO: 14.3 K/UL (ref 1.8–7.7)
NEUTROPHILS # BLD AUTO: 14.4 K/UL (ref 1.8–7.7)
NEUTROPHILS # BLD AUTO: 7.2 K/UL (ref 1.8–7.7)
NEUTROPHILS # BLD AUTO: 7.6 K/UL (ref 1.8–7.7)
NEUTROPHILS # BLD AUTO: 7.7 K/UL (ref 1.8–7.7)
NEUTROPHILS # BLD AUTO: 7.7 K/UL (ref 1.8–7.7)
NEUTROPHILS # BLD AUTO: 8.6 K/UL (ref 1.8–7.7)
NEUTROPHILS # BLD AUTO: 9.7 K/UL (ref 1.8–7.7)
NEUTROPHILS NFR BLD: 67 %
NEUTROPHILS NFR BLD: 67 %
NEUTROPHILS NFR BLD: 69 %
NEUTROPHILS NFR BLD: 70 %
NEUTROPHILS NFR BLD: 73 %
NEUTROPHILS NFR BLD: 74 %
NEUTROPHILS NFR BLD: 79 %
NEUTROPHILS NFR BLD: 87 %
NITRITE UR QL STRIP.AUTO: NEGATIVE
NONHDLC SERPL-MCNC: 77 MG/DL
OSMOLALITY UR CALC.SUM OF ELEC: 263 MOSM/KG (ref 275–295)
OSMOLALITY UR CALC.SUM OF ELEC: 264 MOSM/KG (ref 275–295)
OSMOLALITY UR CALC.SUM OF ELEC: 268 MOSM/KG (ref 275–295)
OSMOLALITY UR CALC.SUM OF ELEC: 269 MOSM/KG (ref 275–295)
OSMOLALITY UR CALC.SUM OF ELEC: 270 MOSM/KG (ref 275–295)
OSMOLALITY UR CALC.SUM OF ELEC: 271 MOSM/KG (ref 275–295)
OSMOLALITY UR CALC.SUM OF ELEC: 271 MOSM/KG (ref 275–295)
OSMOLALITY UR CALC.SUM OF ELEC: 273 MOSM/KG (ref 275–295)
PH UR: 7 [PH] (ref 5–8)
PLATELET # BLD AUTO: 430 K/UL (ref 140–400)
PLATELET # BLD AUTO: 441 K/UL (ref 140–400)
PLATELET # BLD AUTO: 444 K/UL (ref 140–400)
PLATELET # BLD AUTO: 451 K/UL (ref 140–400)
PLATELET # BLD AUTO: 467 K/UL (ref 140–400)
PLATELET # BLD AUTO: 483 K/UL (ref 140–400)
PLATELET # BLD AUTO: 535 K/UL (ref 140–400)
PLATELET # BLD AUTO: 580 K/UL (ref 140–400)
PMV BLD AUTO: 7.2 FL (ref 7.4–10.3)
PMV BLD AUTO: 7.3 FL (ref 7.4–10.3)
PMV BLD AUTO: 7.3 FL (ref 7.4–10.3)
PMV BLD AUTO: 7.7 FL (ref 7.4–10.3)
PMV BLD AUTO: 7.9 FL (ref 7.4–10.3)
PMV BLD AUTO: 8 FL (ref 7.4–10.3)
PMV BLD AUTO: 8 FL (ref 7.4–10.3)
PMV BLD AUTO: 8.1 FL (ref 7.4–10.3)
POTASSIUM SERPL-SCNC: 3.1 MMOL/L (ref 3.3–5.1)
POTASSIUM SERPL-SCNC: 3.2 MMOL/L (ref 3.3–5.1)
POTASSIUM SERPL-SCNC: 3.6 MMOL/L (ref 3.3–5.1)
POTASSIUM SERPL-SCNC: 3.8 MMOL/L (ref 3.3–5.1)
POTASSIUM SERPL-SCNC: 3.9 MMOL/L (ref 3.3–5.1)
POTASSIUM SERPL-SCNC: 4 MMOL/L (ref 3.3–5.1)
POTASSIUM SERPL-SCNC: 4 MMOL/L (ref 3.3–5.1)
POTASSIUM SERPL-SCNC: 4.2 MMOL/L (ref 3.3–5.1)
POTASSIUM SERPL-SCNC: 4.4 MMOL/L (ref 3.3–5.1)
POTASSIUM SERPL-SCNC: 4.6 MMOL/L (ref 3.3–5.1)
PROT SERPL-MCNC: 6.9 G/DL (ref 5.9–8.4)
PROT UR-MCNC: NEGATIVE MG/DL
PROTHROMBIN TIME: 13.1 SECONDS (ref 11.8–14.5)
RBC # BLD AUTO: 3.76 M/UL (ref 4.5–5.9)
RBC # BLD AUTO: 3.78 M/UL (ref 4.5–5.9)
RBC # BLD AUTO: 3.99 M/UL (ref 4.5–5.9)
RBC # BLD AUTO: 4 M/UL (ref 4.5–5.9)
RBC # BLD AUTO: 4.06 M/UL (ref 4.5–5.9)
RBC # BLD AUTO: 4.06 M/UL (ref 4.5–5.9)
RBC # BLD AUTO: 4.16 M/UL (ref 4.5–5.9)
RBC # BLD AUTO: 4.31 M/UL (ref 4.5–5.9)
SODIUM SERPL-SCNC: 127 MMOL/L (ref 136–144)
SODIUM SERPL-SCNC: 128 MMOL/L (ref 136–144)
SODIUM SERPL-SCNC: 128 MMOL/L (ref 136–144)
SODIUM SERPL-SCNC: 129 MMOL/L (ref 136–144)
SODIUM SERPL-SCNC: 130 MMOL/L (ref 136–144)
SODIUM SERPL-SCNC: 131 MMOL/L (ref 136–144)
SODIUM SERPL-SCNC: 132 MMOL/L (ref 136–144)
SP GR UR STRIP: 1.01 (ref 1–1.03)
TRIGL SERPL-MCNC: 76 MG/DL (ref 1–149)
TROPONIN I SERPL-MCNC: 0 NG/ML (ref ?–0.03)
TROPONIN I SERPL-MCNC: 0.01 NG/ML (ref ?–0.03)
TROPONIN I SERPL-MCNC: 0.03 NG/ML (ref ?–0.03)
TSH SERPL-ACNC: 4.07 UIU/ML (ref 0.45–5.33)
UROBILINOGEN UR STRIP-ACNC: <2
VIT C UR-MCNC: NEGATIVE MG/DL
WBC # BLD AUTO: 10.8 K/UL (ref 4–11)
WBC # BLD AUTO: 11.4 K/UL (ref 4–11)
WBC # BLD AUTO: 11.5 K/UL (ref 4–11)
WBC # BLD AUTO: 12.4 K/UL (ref 4–11)
WBC # BLD AUTO: 13.3 K/UL (ref 4–11)
WBC # BLD AUTO: 16.5 K/UL (ref 4–11)
WBC # BLD AUTO: 18.2 K/UL (ref 4–11)
WBC # BLD AUTO: 9.7 K/UL (ref 4–11)

## 2018-01-01 PROCEDURE — 99214 OFFICE O/P EST MOD 30 MIN: CPT | Performed by: INTERNAL MEDICINE

## 2018-01-01 PROCEDURE — 84443 ASSAY THYROID STIM HORMONE: CPT

## 2018-01-01 PROCEDURE — 93017 CV STRESS TEST TRACING ONLY: CPT | Performed by: INTERNAL MEDICINE

## 2018-01-01 PROCEDURE — 36415 COLL VENOUS BLD VENIPUNCTURE: CPT

## 2018-01-01 PROCEDURE — G0463 HOSPITAL OUTPT CLINIC VISIT: HCPCS | Performed by: NURSE PRACTITIONER

## 2018-01-01 PROCEDURE — 80061 LIPID PANEL: CPT

## 2018-01-01 PROCEDURE — 85025 COMPLETE CBC W/AUTO DIFF WBC: CPT

## 2018-01-01 PROCEDURE — 99233 SBSQ HOSP IP/OBS HIGH 50: CPT | Performed by: HOSPITALIST

## 2018-01-01 PROCEDURE — 80053 COMPREHEN METABOLIC PANEL: CPT

## 2018-01-01 PROCEDURE — 71045 X-RAY EXAM CHEST 1 VIEW: CPT | Performed by: EMERGENCY MEDICINE

## 2018-01-01 PROCEDURE — G0463 HOSPITAL OUTPT CLINIC VISIT: HCPCS | Performed by: INTERNAL MEDICINE

## 2018-01-01 PROCEDURE — 027036Z DILATION OF CORONARY ARTERY, ONE ARTERY WITH THREE DRUG-ELUTING INTRALUMINAL DEVICES, PERCUTANEOUS APPROACH: ICD-10-PCS | Performed by: INTERNAL MEDICINE

## 2018-01-01 PROCEDURE — 69210 REMOVE IMPACTED EAR WAX UNI: CPT | Performed by: OTOLARYNGOLOGY

## 2018-01-01 PROCEDURE — 99213 OFFICE O/P EST LOW 20 MIN: CPT | Performed by: OTOLARYNGOLOGY

## 2018-01-01 PROCEDURE — 99223 1ST HOSP IP/OBS HIGH 75: CPT | Performed by: OTHER

## 2018-01-01 PROCEDURE — 4A023N7 MEASUREMENT OF CARDIAC SAMPLING AND PRESSURE, LEFT HEART, PERCUTANEOUS APPROACH: ICD-10-PCS | Performed by: INTERNAL MEDICINE

## 2018-01-01 PROCEDURE — 84450 TRANSFERASE (AST) (SGOT): CPT | Performed by: INTERNAL MEDICINE

## 2018-01-01 PROCEDURE — 80048 BASIC METABOLIC PNL TOTAL CA: CPT

## 2018-01-01 PROCEDURE — 85027 COMPLETE CBC AUTOMATED: CPT

## 2018-01-01 PROCEDURE — 31575 DIAGNOSTIC LARYNGOSCOPY: CPT | Performed by: OTOLARYNGOLOGY

## 2018-01-01 PROCEDURE — 99214 OFFICE O/P EST MOD 30 MIN: CPT | Performed by: NURSE PRACTITIONER

## 2018-01-01 PROCEDURE — 99239 HOSP IP/OBS DSCHRG MGMT >30: CPT | Performed by: HOSPITALIST

## 2018-01-01 PROCEDURE — 84460 ALANINE AMINO (ALT) (SGPT): CPT

## 2018-01-01 PROCEDURE — 88305 TISSUE EXAM BY PATHOLOGIST: CPT | Performed by: INTERNAL MEDICINE

## 2018-01-01 PROCEDURE — 99496 TRANSJ CARE MGMT HIGH F2F 7D: CPT | Performed by: INTERNAL MEDICINE

## 2018-01-01 PROCEDURE — 93306 TTE W/DOPPLER COMPLETE: CPT | Performed by: HOSPITALIST

## 2018-01-01 PROCEDURE — 0DB48ZX EXCISION OF ESOPHAGOGASTRIC JUNCTION, VIA NATURAL OR ARTIFICIAL OPENING ENDOSCOPIC, DIAGNOSTIC: ICD-10-PCS | Performed by: INTERNAL MEDICINE

## 2018-01-01 PROCEDURE — 70450 CT HEAD/BRAIN W/O DYE: CPT | Performed by: HOSPITALIST

## 2018-01-01 PROCEDURE — 99220 INITIAL OBSERVATION CARE,LEVL III: CPT | Performed by: HOSPITALIST

## 2018-01-01 PROCEDURE — 83880 ASSAY OF NATRIURETIC PEPTIDE: CPT

## 2018-01-01 PROCEDURE — 78452 HT MUSCLE IMAGE SPECT MULT: CPT | Performed by: INTERNAL MEDICINE

## 2018-01-01 PROCEDURE — 99202 OFFICE O/P NEW SF 15 MIN: CPT | Performed by: PODIATRIST

## 2018-01-01 PROCEDURE — 93880 EXTRACRANIAL BILAT STUDY: CPT | Performed by: NURSE PRACTITIONER

## 2018-01-01 PROCEDURE — B2101ZZ FLUOROSCOPY OF SINGLE CORONARY ARTERY USING LOW OSMOLAR CONTRAST: ICD-10-PCS | Performed by: INTERNAL MEDICINE

## 2018-01-01 PROCEDURE — 82274 ASSAY TEST FOR BLOOD FECAL: CPT

## 2018-01-01 PROCEDURE — 99232 SBSQ HOSP IP/OBS MODERATE 35: CPT | Performed by: OTHER

## 2018-01-01 PROCEDURE — 70543 MRI ORBT/FAC/NCK W/O &W/DYE: CPT | Performed by: OTHER

## 2018-01-01 PROCEDURE — 70553 MRI BRAIN STEM W/O & W/DYE: CPT | Performed by: OTHER

## 2018-01-01 PROCEDURE — 88312 SPECIAL STAINS GROUP 1: CPT | Performed by: INTERNAL MEDICINE

## 2018-01-01 PROCEDURE — B2111ZZ FLUOROSCOPY OF MULTIPLE CORONARY ARTERIES USING LOW OSMOLAR CONTRAST: ICD-10-PCS | Performed by: INTERNAL MEDICINE

## 2018-01-01 PROCEDURE — 11721 DEBRIDE NAIL 6 OR MORE: CPT | Performed by: PODIATRIST

## 2018-01-01 RX ORDER — METOPROLOL TARTRATE 50 MG/1
50 TABLET, FILM COATED ORAL
Status: DISCONTINUED | OUTPATIENT
Start: 2018-01-01 | End: 2018-01-01

## 2018-01-01 RX ORDER — ONDANSETRON 2 MG/ML
4 INJECTION INTRAMUSCULAR; INTRAVENOUS EVERY 6 HOURS PRN
Status: DISCONTINUED | OUTPATIENT
Start: 2018-01-01 | End: 2018-01-01

## 2018-01-01 RX ORDER — SODIUM CHLORIDE 9 MG/ML
INJECTION, SOLUTION INTRAVENOUS CONTINUOUS
Status: DISCONTINUED | OUTPATIENT
Start: 2018-01-01 | End: 2018-01-01

## 2018-01-01 RX ORDER — CLOPIDOGREL BISULFATE 75 MG/1
75 TABLET ORAL DAILY
Status: DISCONTINUED | OUTPATIENT
Start: 2018-01-01 | End: 2018-01-01

## 2018-01-01 RX ORDER — LISINOPRIL 10 MG/1
10 TABLET ORAL DAILY
Qty: 30 TABLET | Refills: 1 | Status: SHIPPED | OUTPATIENT
Start: 2018-01-01 | End: 2018-01-01

## 2018-01-01 RX ORDER — ASPIRIN 81 MG/1
324 TABLET, CHEWABLE ORAL ONCE
Status: COMPLETED | OUTPATIENT
Start: 2018-01-01 | End: 2018-01-01

## 2018-01-01 RX ORDER — BENZONATATE 100 MG/1
100 CAPSULE ORAL 3 TIMES DAILY PRN
Qty: 30 CAPSULE | Refills: 1 | Status: SHIPPED | OUTPATIENT
Start: 2018-01-01 | End: 2018-01-01

## 2018-01-01 RX ORDER — BENZONATATE 100 MG/1
100 CAPSULE ORAL 3 TIMES DAILY PRN
Qty: 30 CAPSULE | Refills: 0 | Status: CANCELLED | OUTPATIENT
Start: 2018-01-01

## 2018-01-01 RX ORDER — ATORVASTATIN CALCIUM 20 MG/1
10 TABLET, FILM COATED ORAL NIGHTLY
Qty: 30 TABLET | Refills: 5 | Status: SHIPPED | OUTPATIENT
Start: 2018-01-01

## 2018-01-01 RX ORDER — HYDRALAZINE HYDROCHLORIDE 20 MG/ML
10 INJECTION INTRAMUSCULAR; INTRAVENOUS EVERY 6 HOURS PRN
Status: DISCONTINUED | OUTPATIENT
Start: 2018-01-01 | End: 2018-01-01

## 2018-01-01 RX ORDER — ASPIRIN 81 MG/1
81 TABLET ORAL DAILY
Status: DISCONTINUED | OUTPATIENT
Start: 2018-01-01 | End: 2018-01-01

## 2018-01-01 RX ORDER — MAGNESIUM OXIDE 400 MG (241.3 MG MAGNESIUM) TABLET
800 TABLET ONCE
Status: COMPLETED | OUTPATIENT
Start: 2018-01-01 | End: 2018-01-01

## 2018-01-01 RX ORDER — SODIUM CHLORIDE, SODIUM LACTATE, POTASSIUM CHLORIDE, CALCIUM CHLORIDE 600; 310; 30; 20 MG/100ML; MG/100ML; MG/100ML; MG/100ML
INJECTION, SOLUTION INTRAVENOUS CONTINUOUS
Status: DISCONTINUED | OUTPATIENT
Start: 2018-01-01 | End: 2018-01-01

## 2018-01-01 RX ORDER — MECLIZINE HYDROCHLORIDE 25 MG/1
25 TABLET ORAL ONCE
Status: COMPLETED | OUTPATIENT
Start: 2018-01-01 | End: 2018-01-01

## 2018-01-01 RX ORDER — POTASSIUM CHLORIDE 20 MEQ/1
40 TABLET, EXTENDED RELEASE ORAL EVERY 4 HOURS
Status: COMPLETED | OUTPATIENT
Start: 2018-01-01 | End: 2018-01-01

## 2018-01-01 RX ORDER — HEPARIN SODIUM 1000 [USP'U]/ML
INJECTION, SOLUTION INTRAVENOUS; SUBCUTANEOUS
Status: COMPLETED
Start: 2018-01-01 | End: 2018-01-01

## 2018-01-01 RX ORDER — CHLORHEXIDINE GLUCONATE 4 G/100ML
30 SOLUTION TOPICAL
Status: ACTIVE | OUTPATIENT
Start: 2018-01-01 | End: 2018-01-01

## 2018-01-01 RX ORDER — ACETAMINOPHEN 325 MG/1
650 TABLET ORAL EVERY 6 HOURS PRN
Status: DISCONTINUED | OUTPATIENT
Start: 2018-01-01 | End: 2018-01-01

## 2018-01-01 RX ORDER — MIDAZOLAM HYDROCHLORIDE 1 MG/ML
INJECTION INTRAMUSCULAR; INTRAVENOUS
Status: DISCONTINUED
Start: 2018-01-01 | End: 2018-01-01 | Stop reason: WASHOUT

## 2018-01-01 RX ORDER — DIPHENHYDRAMINE HYDROCHLORIDE 50 MG/ML
INJECTION INTRAMUSCULAR; INTRAVENOUS
Status: COMPLETED
Start: 2018-01-01 | End: 2018-01-01

## 2018-01-01 RX ORDER — MAGNESIUM OXIDE 400 MG (241.3 MG MAGNESIUM) TABLET
400 TABLET ONCE
Status: COMPLETED | OUTPATIENT
Start: 2018-01-01 | End: 2018-01-01

## 2018-01-01 RX ORDER — PREDNISONE 10 MG/1
TABLET ORAL
Qty: 20 TABLET | Refills: 0 | Status: SHIPPED | OUTPATIENT
Start: 2018-01-01 | End: 2018-01-01

## 2018-01-01 RX ORDER — VERAPAMIL HYDROCHLORIDE 2.5 MG/ML
INJECTION, SOLUTION INTRAVENOUS
Status: COMPLETED
Start: 2018-01-01 | End: 2018-01-01

## 2018-01-01 RX ORDER — HYDRALAZINE HYDROCHLORIDE 20 MG/ML
10 INJECTION INTRAMUSCULAR; INTRAVENOUS ONCE
Status: COMPLETED | OUTPATIENT
Start: 2018-01-01 | End: 2018-01-01

## 2018-01-01 RX ORDER — MIDAZOLAM HYDROCHLORIDE 1 MG/ML
INJECTION INTRAMUSCULAR; INTRAVENOUS
Status: COMPLETED
Start: 2018-01-01 | End: 2018-01-01

## 2018-01-01 RX ORDER — NITROGLYCERIN 20 MG/100ML
INJECTION INTRAVENOUS
Status: COMPLETED
Start: 2018-01-01 | End: 2018-01-01

## 2018-01-01 RX ORDER — LABETALOL HYDROCHLORIDE 5 MG/ML
20 INJECTION, SOLUTION INTRAVENOUS ONCE
Status: COMPLETED | OUTPATIENT
Start: 2018-01-01 | End: 2018-01-01

## 2018-01-01 RX ORDER — SODIUM CHLORIDE 9 MG/ML
INJECTION, SOLUTION INTRAVENOUS CONTINUOUS
Status: ACTIVE | OUTPATIENT
Start: 2018-01-01 | End: 2018-01-01

## 2018-01-01 RX ORDER — POTASSIUM CHLORIDE 20 MEQ/1
40 TABLET, EXTENDED RELEASE ORAL EVERY 4 HOURS
Status: ACTIVE | OUTPATIENT
Start: 2018-01-01 | End: 2018-01-01

## 2018-01-01 RX ORDER — POTASSIUM CHLORIDE 20 MEQ/1
40 TABLET, EXTENDED RELEASE ORAL ONCE
Status: COMPLETED | OUTPATIENT
Start: 2018-01-01 | End: 2018-01-01

## 2018-01-01 RX ORDER — LISINOPRIL 10 MG/1
10 TABLET ORAL DAILY
Status: DISCONTINUED | OUTPATIENT
Start: 2018-01-01 | End: 2018-01-01

## 2018-01-01 RX ORDER — CLOPIDOGREL BISULFATE 75 MG/1
TABLET ORAL
Status: COMPLETED
Start: 2018-01-01 | End: 2018-01-01

## 2018-01-01 RX ORDER — LISINOPRIL 10 MG/1
TABLET ORAL
Qty: 60 TABLET | Refills: 0 | Status: SHIPPED | OUTPATIENT
Start: 2018-01-01

## 2018-01-01 RX ORDER — NALOXONE HYDROCHLORIDE 0.4 MG/ML
80 INJECTION, SOLUTION INTRAMUSCULAR; INTRAVENOUS; SUBCUTANEOUS AS NEEDED
Status: DISCONTINUED | OUTPATIENT
Start: 2018-01-01 | End: 2018-01-01

## 2018-01-01 RX ORDER — LISINOPRIL 10 MG/1
TABLET ORAL
Qty: 60 TABLET | Refills: 2 | Status: SHIPPED | OUTPATIENT
Start: 2018-01-01 | End: 2018-01-01

## 2018-01-01 RX ORDER — SODIUM CHLORIDE 9 MG/ML
INJECTION, SOLUTION INTRAVENOUS
Status: COMPLETED
Start: 2018-01-01 | End: 2018-01-01

## 2018-01-01 RX ORDER — CHLORHEXIDINE GLUCONATE 4 G/100ML
30 SOLUTION TOPICAL
Status: COMPLETED | OUTPATIENT
Start: 2018-01-01 | End: 2018-01-01

## 2018-01-01 RX ORDER — CALCIUM CARBONATE 200(500)MG
500 TABLET,CHEWABLE ORAL 3 TIMES DAILY PRN
Status: DISCONTINUED | OUTPATIENT
Start: 2018-01-01 | End: 2018-01-01

## 2018-01-01 RX ORDER — LIDOCAINE HYDROCHLORIDE 10 MG/ML
INJECTION, SOLUTION EPIDURAL; INFILTRATION; INTRACAUDAL; PERINEURAL AS NEEDED
Status: DISCONTINUED | OUTPATIENT
Start: 2018-01-01 | End: 2018-01-01 | Stop reason: SURG

## 2018-01-01 RX ORDER — ASPIRIN 325 MG
325 TABLET ORAL DAILY
Status: DISCONTINUED | OUTPATIENT
Start: 2018-01-01 | End: 2018-01-01

## 2018-01-01 RX ORDER — LIDOCAINE HYDROCHLORIDE 20 MG/ML
INJECTION, SOLUTION EPIDURAL; INFILTRATION; INTRACAUDAL; PERINEURAL
Status: COMPLETED
Start: 2018-01-01 | End: 2018-01-01

## 2018-01-01 RX ORDER — LISINOPRIL 10 MG/1
TABLET ORAL
Qty: 60 TABLET | Refills: 0 | Status: SHIPPED | OUTPATIENT
Start: 2018-01-01 | End: 2018-01-01

## 2018-01-01 RX ORDER — ATORVASTATIN CALCIUM 80 MG/1
80 TABLET, FILM COATED ORAL NIGHTLY
Qty: 30 TABLET | Refills: 1 | Status: SHIPPED | OUTPATIENT
Start: 2018-01-01 | End: 2018-01-01 | Stop reason: ALTCHOICE

## 2018-01-01 RX ORDER — LISINOPRIL 10 MG/1
10 TABLET ORAL DAILY
Qty: 30 TABLET | Refills: 1 | Status: SHIPPED | OUTPATIENT
Start: 2018-01-01 | End: 2018-01-01 | Stop reason: DRUGHIGH

## 2018-01-01 RX ORDER — DOCUSATE SODIUM 100 MG/1
100 CAPSULE, LIQUID FILLED ORAL 2 TIMES DAILY
Status: DISCONTINUED | OUTPATIENT
Start: 2018-01-01 | End: 2018-01-01

## 2018-01-01 RX ORDER — CLOPIDOGREL BISULFATE 75 MG/1
150 TABLET ORAL DAILY
Status: COMPLETED | OUTPATIENT
Start: 2018-01-01 | End: 2018-01-01

## 2018-01-01 RX ORDER — SODIUM CHLORIDE 0.9 % (FLUSH) 0.9 %
10 SYRINGE (ML) INJECTION AS NEEDED
Status: DISCONTINUED | OUTPATIENT
Start: 2018-01-01 | End: 2018-01-01

## 2018-01-01 RX ORDER — MIDAZOLAM HYDROCHLORIDE 1 MG/ML
1 INJECTION INTRAMUSCULAR; INTRAVENOUS EVERY 5 MIN PRN
Status: DISCONTINUED | OUTPATIENT
Start: 2018-01-01 | End: 2018-01-01

## 2018-01-01 RX ORDER — ATORVASTATIN CALCIUM 40 MG/1
80 TABLET, FILM COATED ORAL NIGHTLY
Status: DISCONTINUED | OUTPATIENT
Start: 2018-01-01 | End: 2018-01-01

## 2018-01-01 RX ORDER — NITROGLYCERIN 0.4 MG/1
0.4 TABLET SUBLINGUAL EVERY 5 MIN PRN
Status: DISCONTINUED | OUTPATIENT
Start: 2018-01-01 | End: 2018-01-01

## 2018-01-01 RX ORDER — ATORVASTATIN CALCIUM 20 MG/1
20 TABLET, FILM COATED ORAL NIGHTLY
Qty: 30 TABLET | Refills: 5 | Status: SHIPPED | OUTPATIENT
Start: 2018-01-01 | End: 2018-01-01

## 2018-01-01 RX ORDER — ONDANSETRON 2 MG/ML
4 INJECTION INTRAMUSCULAR; INTRAVENOUS ONCE
Status: COMPLETED | OUTPATIENT
Start: 2018-01-01 | End: 2018-01-01

## 2018-01-01 RX ORDER — SODIUM CHLORIDE 0.9 % (FLUSH) 0.9 %
3 SYRINGE (ML) INJECTION AS NEEDED
Status: DISCONTINUED | OUTPATIENT
Start: 2018-01-01 | End: 2018-01-01

## 2018-01-01 RX ORDER — LISINOPRIL 10 MG/1
TABLET ORAL
Qty: 180 TABLET | Refills: 2 | Status: SHIPPED | OUTPATIENT
Start: 2018-01-01

## 2018-01-01 RX ORDER — METOPROLOL SUCCINATE 25 MG/1
12.5 TABLET, EXTENDED RELEASE ORAL
Status: DISCONTINUED | OUTPATIENT
Start: 2018-01-01 | End: 2018-01-01

## 2018-01-01 RX ORDER — CLOPIDOGREL BISULFATE 75 MG/1
75 TABLET ORAL DAILY
Qty: 30 TABLET | Refills: 11 | Status: SHIPPED | OUTPATIENT
Start: 2018-01-01

## 2018-01-01 RX ORDER — ASPIRIN 81 MG/1
324 TABLET, CHEWABLE ORAL ONCE
Status: DISCONTINUED | OUTPATIENT
Start: 2018-01-01 | End: 2018-01-01

## 2018-01-01 RX ORDER — HYDROCHLOROTHIAZIDE 25 MG/1
25 TABLET ORAL DAILY
Status: DISCONTINUED | OUTPATIENT
Start: 2018-01-01 | End: 2018-01-01

## 2018-01-01 RX ORDER — 0.9 % SODIUM CHLORIDE 0.9 %
VIAL (ML) INJECTION
Status: COMPLETED
Start: 2018-01-01 | End: 2018-01-01

## 2018-01-01 RX ADMIN — LIDOCAINE HYDROCHLORIDE 50 MG: 10 INJECTION, SOLUTION EPIDURAL; INFILTRATION; INTRACAUDAL; PERINEURAL at 07:39:00

## 2018-01-09 NOTE — TELEPHONE ENCOUNTER
LMTCB transfer to triage    BMP pended for 1 month from now      Notes Recorded by Josefa Nova on 1/9/2018 at 1:17 PM CST  Letter mailed order entered  ------    Notes Recorded by Sean Calzada MD on 1/9/2018 at 12:30 PM CST  Blood test good.

## 2018-01-09 NOTE — TELEPHONE ENCOUNTER
Action Requested: Summary for Provider     []  Critical Lab, Recommendations Needed  [x] Need Additional Advice  []   FYI    []   Need Orders  [] Need Medications Sent to Pharmacy  []  Other     SUMMARY: pt called states for the past 4-5 days on and off a

## 2018-01-10 NOTE — PROGRESS NOTES
Leonel Lindsay is a 80year old male. HPI:   1. Cough    Has had a cough for the last week. Today he woke up and throat is dry. No sore throat. Non productive cough.      2. Essential hypertension with goal blood pressure less than 130/85    Patient has been f Alcohol use:  Yes              Comment: couple drinks a week       REVIEW OF SYSTEMS:   GENERAL HEALTH: feels well otherwise  SKIN: denies any unusual skin lesions or rashes  RESPIRATORY: denies shortness of breath with exertion  CARDIOV

## 2018-01-18 PROBLEM — E87.1 HYPONATREMIA: Status: ACTIVE | Noted: 2018-01-01

## 2018-01-18 PROBLEM — R94.31 ABNORMAL EKG: Status: ACTIVE | Noted: 2018-01-01

## 2018-01-18 PROBLEM — R42 DIZZINESS: Status: ACTIVE | Noted: 2018-01-01

## 2018-01-18 NOTE — TELEPHONE ENCOUNTER
Spoke with patient (identified name and ) ,states feeling much better, stopping the tessalon perles as he is not coughing anymore. No need to make any OV.

## 2018-01-18 NOTE — ED PROVIDER NOTES
Patient Seen in: [facility] AND [facility] Emergency Department    History   Patient presents with:  Dizziness (neurologic)    Stated Complaint: dizziness    HPI    45-year-old male with history of hypertension, hyperlipidemia, here with complaints of acute onse Age 5        Smoking status: Former Smoker                                                              Packs/day: 0.00      Years: 0.00      Smokeless tobacco: Never Used                      Alcohol use:  Yes              Comment: couple drinks a week Pulmonary/Chest: Effort normal and breath sounds normal. No stridor. No respiratory distress. He has no wheezes. He has no rales. Abdominal: Soft. He exhibits no distension. There is no tenderness. There is no guarding.    Musculoskeletal: Normal range Glucose 187 (H) 70 - 99 mg/dL   Sodium 128 (L) 136 - 144 mmol/L   Potassium 3.6 3.3 - 5.1 mmol/L   Chloride 89 (L) 95 - 110 mmol/L   CO2 26 22 - 32 mmol/L   BUN 14 8 - 20 mg/dL   Creatinine 0.69 0.50 - 1.50 mg/dL   Calcium, Total 9.1 8.5 - 10.5 mg/dL   B Available and Reviewed by me while in ED:  Xr Chest Ap Portable  (cpt=71045)    Result Date: 1/18/2018  CONCLUSION:  1. Suboptimal inspiration. No acute cardiopulmonary findings. 2. Mild biapical emphysematous change and pleural parenchymal scar.  3. Minima and reviewed those reports. Complicating Factors: The patient already has has Age-related nuclear cataract of both eyes; Blepharitis; MGD (meibomian gland dysfunction); Drusen of macula of both eyes; Hypertensive retinopathy of left eye; Floaters;  Jovita Love

## 2018-01-18 NOTE — CONSULTS
[facility] AND [facility]  [facility] Cardiology Consult Note    Alex Chopra Patient Status:  Emergency    1927 MRN [id number]   Location [address] Attending Stefani Hinojosa MD   Hosp Day # 0 PCP Alanis Leach MD      Floaters 7/29/2015   • Hypertensive retinopathy of left eye 2011, 07-    OS   • Other and unspecified hyperlipidemia    • Unspecified essential hypertension 2007   • Vitreous floaters of right eye 2011    OD     Past Surgical History:  2000: CARPAL Without clubbing, cyanosis or edema. Peripheral pulses are 2+. Neurologic: Alert and oriented, normal affect. Skin: Warm and dry.        Sima Schwarz MD  1/18/2018  4:54 PM

## 2018-01-18 NOTE — ED INITIAL ASSESSMENT (HPI)
Patient presents to ER via medics with c/o dizziness that began about 30-45 mins PTA. Denies CP, SOB.

## 2018-01-19 NOTE — PROGRESS NOTES
[facility]  Progress Note     Jackie Piper  : 1927    Status: Inpatient  Day #: 0    Attending: Laura Riley MD  PCP: Gage Payne MD      Assessment and Plan     Chest pain with abnormal EKG  - cardiology on consult  - echo revi --    --   92*   CO2  26   --    --   26   GLU  187*   --    --   113*   TROP  0.00  0.01  0.03   --        Us Carotid Doppler Bilat - Diag Img (cpt=93880)    Result Date: 1/19/2018  CONCLUSION:  1.  No hemodynamically significant stenosis of the right or

## 2018-01-19 NOTE — DIETARY NOTE
ADULT NUTRITION INITIAL ASSESSMENT    Pt is at high nutrition risk. Pt meets malnutrition criteria.       CRITERIA FOR MALNUTRITION DIAGNOSIS:  Criteria for non-severe malnutrition diagnosis: Physiological causes increasing nutrient needs d/t illness relat encourage PO and supplement intake    - Nutrition education: Discussed supplement options and ways to increase Kcal/Protein intake, assess other education needs PTA    - Coordination of nutrition care: collaboration with other providers    - Discharge and 75-80 grams protein/day (1.3-1.4 grams protein per kg)    MONITOR AND EVALUATE/NUTRITION GOALS:  - Food and Nutrient Intake:      Monitor: PO & supplement tolerance/intake and adequacy of intake.     - Anthropometric Measurement:      Monitor: wt     - Nutr

## 2018-01-19 NOTE — TELEPHONE ENCOUNTER
Luiz Georgi Arvizu was in the ER today with orthostatic hypotension when he stood up at home. In the ER he had an elevated WBC of 16.5 with a left shift, he was afebrile, his sodium was lower than usual 128, Chest xray did not show an infiltrate.    Ur

## 2018-01-19 NOTE — PROGRESS NOTES
[facility]    Cardiology Progress Note  [facility]    Gianluca Dela Cruz Patient Status:  Observation    1927 MRN [id number]   Location [facility] 3W/SW Attending Mariana Gavin MD   Hosp Day # 0 PCP Sakshi Taylor 01/06/2018   TROP 0.03 01/19/2018       Xr Chest Ap Portable  (cpt=71045)    Result Date: 1/18/2018  CONCLUSION:  1. Suboptimal inspiration. No acute cardiopulmonary findings. 2. Mild biapical emphysematous change and pleural parenchymal scar.  3. Minimal l

## 2018-01-19 NOTE — H&P
[facility]    PATIENT'S NAME: Trupti Monroy   ATTENDING PHYSICIAN: Kin Gunderson MD   PATIENT ACCOUNT#:   [id number]    LOCATION:  [address] [place] [address] #:   [id number]       YOB: 1927  ADMISSION DATE:       01/18/2018    H felt very weak. Could not stand up from a sitting position, and felt lightheaded, dizzy, then he felt mild discomfort in his midsternal area. No diaphoresis, no jaw pain, no neck pain, no arm pain. Other 12-point review of systems negative.         PHYSI

## 2018-01-20 NOTE — OCCUPATIONAL THERAPY NOTE
OCCUPATIONAL THERAPY EVALUATION - INPATIENT     Room Number: 091/697-T  Evaluation Date: 1/20/2018  Type of Evaluation: Initial      Physician Order: IP Consult to Occupational Therapy  Reason for Therapy: ADL/IADL Dysfunction and Discharge Planning    O COLONOSCOPY  2005: COLONOSCOPY  2008: COLONOSCOPY  No date: COLONOSCOPY & POLYPECTOMY  10-: ELECTROCARDIOGRAM, COMPLETE      Comment: SCANNED TO MEDIA TAB: 10-  1977: HEMORRHOIDECTOMY  02/1978:  HERNIA SURGERY Right  02/1985: HERNIA SURGERY Ri taking off regular lower body clothing?: None  -   Bathing (including washing, rinsing, drying)?: None  -   Toileting, which includes using toilet, bedpan or urinal? : None  -   Putting on and taking off regular upper body clothing?: None  -   Taking care

## 2018-01-20 NOTE — PROGRESS NOTES
[facility]  Progress Note     Leonel Lindsay  : 1927    Status: Inpatient  Day #: 1    Attending: Nancy Arellano MD  PCP: Helane Lundborg, MD      Assessment and Plan     Chest pain with abnormal EKG  - cardiology on consult  - echo - no 01/18/18   1700  01/19/18   0002  01/19/18   0549  01/20/18   0453   BUN  14   --    --   12   --    CREATSERUM  0.69   --    --   0.69   --    GFRAA  >60   --    --   >60   --    GFRNAA  >60   --    --   >60   --    CA  9.1   --    --   8.7   --    NA  12 01/18/2018 at 17:14 by Angela Mclaughlin MD    Ekg 12-lead    Result Date: 1/18/2018  ECG Report  Interpretation  -------------------------- Sinus Rhythm OLD INFERIOR MI NEW ST ELEVATIONS INFERIORLY SUGGEST CURRENT OF INJURY.  ACUTE MI Electronically signed on

## 2018-01-20 NOTE — PHYSICAL THERAPY NOTE
Chart reviewed    Therapy attempt for PT eval this AM    Pt at stress test and not available     PT will follow up this PM as appropriate and schedule allows      Attempt x 1

## 2018-01-21 NOTE — PHYSICAL THERAPY NOTE
PHYSICAL THERAPY EVALUATION - INPATIENT     Room Number: 021/358-P  Evaluation Date: 1/20/2018  Type of Evaluation: Initial  Physician Order: PT Eval and Treat    Presenting Problem: abnormal EKG  dizziness  chest heaviness   unsteady gait   cardiology present with increased fall risk. Pt present status patient's is below pt  pre-admission status. PT will follow pt this admission progressing as approp. D/c plans and equipment  needs  pending progress with therapy and further acute management.     Pt weak, could not stand up on his own. [facility] was wobbly with his gait.  Felt lightheaded.  Brought into the emergency department for evaluation.  Urinalysis unremarkable.  Sodium 128, chloride 89, BUN and creatinine ratio of 20.3.  CBC showed a white blood shmuel Medical History:   Diagnosis Date   • Cataracts, bilateral 2011    OU   • Chalazion of left lower eyelid 2011    OS, LLL   • Chalazion of right lower eyelid 2012    OD, RLL   • Drusen (degenerative) of retina 2011    OS; \"hard drusen\"   • Floaters 7/29/2 STRENGTH ASSESSMENT  Upper extremity ROM are within functional limits    Lower extremity ROM is within functional limits    Note generalized weakness   Pt reports has not been OOB In a few days reports feels weak   Mild weakness noted by difficulty and alirio Assistive Device: None (would recommend need for trial with AD )  Pattern: Shuffle;R Steppage;L Steppage (gait instability noted  pt reaching to steady self on furniture in room )  Pt with shakiness noted as well during ambulation ----transport arrived u

## 2018-01-21 NOTE — PHYSICAL THERAPY NOTE
PHYSICAL THERAPY TREATMENT NOTE - INPATIENT    Room Number: 189/344-N       Presenting Problem: abnormal EKG  dizziness  chest heaviness   unsteady gait   cardiology and neuro team following pt.      Problem List  Principal Problem:    Abnormal EKG  Active Static Sitting: Good  Dynamic Sitting: Good           Static Standing: Fair  Dynamic Standing: Fair -    ACTIVITY TOLERANCE  Room air    AM-PAC '6-Clicks' INPATIENT SHORT FORM - Patient is able to ambulate 200 -250 ft feet with assist device: LRAD  at assistance level: supervision  With improved tolerance and stable vitals    Goal #3   Current Status 200ft with rolling walker min A X 1    Goal #4 Patient will negotiate 6  stairs/o

## 2018-01-21 NOTE — PROGRESS NOTES
[facility]  Progress Note     Birdie Nagy  : 1927    Status: Inpatient  Day #: 2    Attending: Kortney Ibarra MD  PCP: Mary Garibay MD      Assessment and Plan     Chest pain with abnormal EKG  - cardiology on consult  - echo - no PLT  580*  535*  483*   RBC  4.31*  3.76*  3.99*   MCV  87.3  86.4  87.8   MCH  29.5  29.6  29.7   MCHC  33.7  34.2  33.9   RDW  12.7  12.6  13.2     Recent Labs   Lab  01/18/18   1437  01/18/18   1700  01/19/18   0002  01/19/18   0549  01/20/18   0453 the left optic nerve sheath, suggesting minimal left perineuritis. 2. Moderate sequelae of chronic microangiopathy. 3. Rightward nasal septal deviation. 4. Trace right maxillary sinus mucosal thickening. 5. Lesser incidental findings as above.

## 2018-01-21 NOTE — PROGRESS NOTES
[facility] AND [facility]  Neurology Progress Note    Vonnie Jones Patient Status:  Inpatient    1927 MRN [id number]   Location [facility] 3W/SW Attending Flavio Serna MD   Hosp Day # 2 PCP Mariana Munoz MD     Subjective:  Vonnie Jones is a(n) 38 confrontation        Fundoscopically- No papilledema or retinal hemorrhages. Normal optic discs, sharp edges. III, IV, VI- EOM intact, AMY  V. Facial sensation intact  VII. Face symmetric, no facial weakness  VIII.  Hearing intact to whisper, tuning fork

## 2018-01-21 NOTE — PROGRESS NOTES
[facility]  Cardiology Progress Note    Chrissy Reasons Patient Status:  Inpatient    1927 MRN [id number]   Location [facility] 3W/SW Attending Anitra Cordero MD   Hosp Day # 2 PCP Vinnie Nguyễn MD       Subjective: Still feeling tired ALT, AST, ALB, AMYLASE, LIPASE, LDH in the last 72 hours.     Invalid input(s): ALPHOS, TBIL, DBIL, TPROT    Recent Labs   Lab  01/18/18   1437  01/18/18   1700  01/19/18   0002   TROP  0.00  0.01  0.03       Allergies:     Amoxicillin                 Comme

## 2018-01-22 NOTE — PHYSICAL THERAPY NOTE
PHYSICAL THERAPY TREATMENT NOTE - INPATIENT    Room Number: 843/712-J       Presenting Problem: abnormal EKG  dizziness  chest heaviness   unsteady gait   cardiology and neuro team following pt.      Problem List  Principal Problem:    Abnormal EKG  Active [address]  Room air    AM-PAC '6-Clicks' INPATIENT SHORT FORM - BASIC MOBILITY  How much difficulty does the patient currently have. ..  -   Turning over in bed (including adjusting bedclothes, sheets and blankets)?: None   - and supervision with improved tolerance and safety awareness    Goal #4   Current Status Not tested    Goal #5 Patient  And family to verbalize and return demonstration of therapy education for fall risk prevention and energy conservation strategies  /impr

## 2018-01-22 NOTE — HOME CARE LIAISON
MET WITH PATIENT AND HIS GUÉRET,   ([phone]),  Paco Tomlinson. BOTH IN AGREEMENT WITH SERVICES BEING PROVIDED BY [facility].  PROVIDED RESIDENTIAL BROCHURE WITH CONTACT INFORMATION, ALONG WITH LIAISON'S BUSINESS C

## 2018-01-22 NOTE — OCCUPATIONAL THERAPY NOTE
OCCUPATIONAL THERAPY TREATMENT NOTE - INPATIENT     Room Number: 628/936-P          Presenting Problem: dizziness, abnormal EKG    Problem List  Principal Problem:    Abnormal EKG  Active Problems:    Dizziness    Hyponatremia    Vision problem      ASSESS RESTRICTION  Weight Bearing Restriction: None                PAIN ASSESSMENT  Ratin           ACTIVITIES OF DAILY LIVING ASSESSMENT  AM-PAC ‘6-Clicks’ Inpatient Daily Activity Short Form  How much help from another person does the patient currently nee

## 2018-01-22 NOTE — PROGRESS NOTES
[facility]  Progress Note     Deadra Lax  : 1927    Status: Inpatient  Day #: 3    Attending: Trevor Gillespie MD  PCP: Tianna Bailey MD      Assessment and Plan     Chest pain with abnormal EKG  - cardiology on consult  - echo - no RDW  13.2     Recent Labs   Lab  01/20/18   0453  01/21/18   0622  01/21/18   2113   BUN   --   9   --    CREATSERUM   --   0.59   --    GFRAA   --   >60   --    GFRNAA   --   >60   --    CA   --   8.5   --    NA   --   132*   --    K  4.0  3.6  4.2   CL

## 2018-01-22 NOTE — PROGRESS NOTES
[facility] Cardiology   Progress Note    Alex Chopra Patient Status:  Inpatient    1927 MRN [id number]   Location [facility] 3W/SW Attending Matthew Edwards MD   Hosp Day # 3 PCP MD Mirna Rizo -LDL=62    4) Dehydration  -on IVF at 75ml/hr  -kidney function normal    5) Hypertension  -140-150s up to 170-180s  -on HCTZ 25mg daily and Lisinopril 10mg daily  -hydralazine PRN    6) AIVR/vtach  -14 and 16 beats, asymptomatic  -EF55-60%  -K normal, c maxillary sinus mucosal thickening. 5. Lesser incidental findings as above. BRIAN Miguel  2/93/1797      =======================================================  Patient seen and examined independently.   Note reviewed and labs review

## 2018-01-22 NOTE — CONSULTS
PHYSICAL MEDICINE AND REHABILITATION CONSULTATION     CC: Impaired mobility and ADL dysfunction secondary to weakness, dizziness     HPI: This is a 81 y/o male with a PMH of Hypertension, hyperlipidemia, emphysema, osteoarthritis, retina degeneration, who lower eyelid 2011    OS, LLL   • Chalazion of right lower eyelid 2012    OD, RLL   • Drusen (degenerative) of retina 2011    OS; \"hard drusen\"   • Floaters 7/29/2015   • Hypertensive retinopathy of left eye 2011, 07-    OS   • Other and unspecifie Full Code    OBJECTIVE:    /64 (BP Location: Right arm)   Pulse 83   Temp 97.9 °F (36.6 °C) (Oral)   Resp 20   Ht 66\"   Wt 122 lb 11.2 oz   SpO2 97%   BMI 19.80 kg/m²   General: Elderly male of thin Build   Head: Normocephalic, atraumatic, without o and/or functional status change.      Thank you for this consultation,  Jessica Salvador MD  [address] [place] [address]

## 2018-01-22 NOTE — CM/SW NOTE
Explanation of of BPCI/Medicare program provided. Patient was enrolled under DRG 72. Patient/family agreed to phone f/u for 3 months from [facility] after discharge from [address] [place] [address]. BPCI/Medicare letter and brochure provided.     Oni Leal, [address] [place] [address]

## 2018-01-22 NOTE — CM/SW NOTE
SW received order for [facility] services. SW met w/ pt to discuss eventual discharge needs. Pt lives at home w/ his supportive daughter, Rita Reynoso in [place]. Pt lives in a 2 level duplex house, w/ 4 external steps and 7 stairs up.  Pt reports to be independent w/

## 2018-01-23 NOTE — PROGRESS NOTES
[facility]    Progress Note    José Antonio Liriano Patient Status:  Inpatient    1927 MRN [id number]   Location [facility] 3W/SW Attending Debra Blake MD   Hosp Day # 4 PCP Luis Romero MD       Assessment and Plan:      1.  Abn nontender, nondistended, no organomegaly, bowel sounds present  Ext:  no clubbing, no cyanosis,no edema  Neuro: no focal deficits  Skin: no rashes or lesions    Scheduled Meds:   • carbamide peroxide  5 drop Left Ear BID   • metoprolol Tartrate  25 mg Oral

## 2018-01-23 NOTE — PHYSICAL THERAPY NOTE
PHYSICAL THERAPY TREATMENT NOTE - INPATIENT    Room Number: 960/798-S       Presenting Problem: abnormal EKG  dizziness  chest heaviness   unsteady gait   cardiology and neuro team following pt.      Problem List  Principal Problem:    Abnormal EKG  Active down  6 steps with rail and close CGA /min assist needed for pt safety . dtr is present for stair training. Recommended pt dtr purchase of gait belt. Pt with decrease tolerance / decrease balance / mild generalized weakness .  Pt remains  with incr blankets)?: None   -   Sitting down on and standing up from a chair with arms (e.g., wheelchair, bedside commode, etc.): None   -   Moving from lying on back to sitting on the side of the bed?: None   How much help from another person does the patient curr stable vitals    Goal #3   Current Status See above      Goal #4 Patient will negotiate 6  stairs/one curb w/ assistive device and supervision with improved tolerance and safety awareness    Goal #4   Current Status Up down 6 steps min assist    Goal #5 Pa

## 2018-01-23 NOTE — PLAN OF CARE
CARDIOVASCULAR - ADULT    • Maintains optimal cardiac output and hemodynamic stability Not Progressing    Blood pressure remains elevated at times       CARDIOVASCULAR - ADULT    • Absence of cardiac arrhythmias or at baseline Progressing    No arrhythmias

## 2018-01-23 NOTE — PROGRESS NOTES
[facility]  Progress Note     Denita Solorzano  : 1927    Status: Inpatient  Day #: 4    Attending: Danielito Brock MD  PCP: Yehuda Soulier, MD      Assessment and Plan     Chest pain with abnormal EKG  - cardiology on consult  - echo - no Recent Labs   Lab  01/21/18 0622   WBC  11.5*   HGB  11.9*   HCT  35.1*   PLT  483*   RBC  3.99*   MCV  87.8   MCH  29.7   MCHC  33.9   RDW  13.2     Recent Labs   Lab  01/21/18   0622 01/21/18 2113  01/23/18   0541   BUN  9   --    --    CREA

## 2018-01-23 NOTE — PROGRESS NOTES
[facility]    Cardiology Progress Note  [facility]    Adam Ochoa Patient Status:  Inpatient    1927 MRN [id number]   Location [facility] 3W/SW Attending Gita Moss MD   [facility] Day # 4 PCP Weston Woo Amarilys Philip MD  1/23/2018

## 2018-01-24 NOTE — PROGRESS NOTES
[facility]    Progress Note    Adam Ochoa Patient Status:  Inpatient    1927 MRN [id number]   Location [facility] 3W/SW Attending Ruddy Stoddard MD   Hosp Day # 5 PCP Cara Mercedes MD     Subjective:     Respirator 01/06/2018   TP 6.9 01/06/2018   AST 26 01/06/2018   ALT 18 01/06/2018   TSH 4.07 01/06/2018   MG 1.7 (L) 01/24/2018   TROP 0.03 01/19/2018                         Jojo Ruiz, BRIAN  1/24/2018

## 2018-01-24 NOTE — PLAN OF CARE
CARDIOVASCULAR - ADULT    • Maintains optimal cardiac output and hemodynamic stability Progressing    • Absence of cardiac arrhythmias or at baseline Progressing    Hemodynamically stable - cath planned for Tursday, 1/25    NEUROLOGICAL - ADULT    • Achiev

## 2018-01-24 NOTE — PROGRESS NOTES
[facility]    Progress Note    Yulia Valdivia Patient Status:  Inpatient    1927 MRN [id number]   Location [facility] 3W/SW Attending Chris Gomes MD   Hosp Day # 5 PCP Leonce Seip, MD       Subjective:     Carmina TRUJILLO. proph:   ambulating    Code status:   Full    >35 minutes spent     Daurte Carolina MD  1/24/2018

## 2018-01-24 NOTE — OCCUPATIONAL THERAPY NOTE
OCCUPATIONAL THERAPY TREATMENT NOTE - INPATIENT     Room Number: 600/161-W          Presenting Problem: dizziness, abnormal EKG    Problem List  Principal Problem:    Abnormal EKG  Active Problems:    Dizziness    Hyponatremia    Vision problem      ASSESS Discharge Recommendations: Home with home health PT/OT;24 hour care/supervision  OT Device Recommendations:  (bedside commode)      PLAN  OT Treatment Plan: Balance activities; Energy conservation/work simplification techniques;ADL training;IADL training;UE tolerance  Patient End of Session: Up in chair;Needs met;Call light within reach;RN aware of session/findings; All patient questions and concerns addressed; Family present    OT Goals:       Patient will complete functional transfer Ind and no c/o dizziness

## 2018-01-24 NOTE — DIETARY NOTE
ADULT NUTRITION ReASSESSMENT    Pt is at moderate nutrition risk. Pt meets malnutrition criteria.       CRITERIA FOR MALNUTRITION DIAGNOSIS:  Criteria for non-severe malnutrition diagnosis: Physiological causes increasing nutrient needs d/t illness related Food Supplements-RD added Ensure at Breakfast (Pt prefers Chocolate or Van flavor). Rational/use of oral supplements discussed.   - Vitamin and mineral supplements: multivitamin/mineral and Vit D  - Feeding assistance: meal set up and encourage PO and suppl Skin Integrity: intact and RN documentation reviewed.   - Ruben score 20    NUTRITION PRESCRIPTION:  Diet: Cardiac   Oral Supplements: Ensure at Bk   Adds 250 kcal and 9 gm protein  Calories: 8268-7295 calories/day (30-35 calories per kg)  Protein: 75-80 g

## 2018-01-25 NOTE — PROGRESS NOTES
Patient tolerating clear, full liquids and soft diet post cath from floor stock. No complaints of nausea or abdominal discomfort. Will advance to cardiac diet.

## 2018-01-25 NOTE — PROCEDURES
[facility]    PATIENT'S NAME: Phong Fletcher   ATTENDING PHYSICIAN: Duarte Carolina MD   OPERATING PHYSICIAN: Stella Lipscomb.  Samuel New MD   PATIENT ACCOUNT#:   [id number]    LOCATION:  3WSW [address] #:   [id number]       DATE OF BIRTH:  [birth year] above.    Dictated By Raul Samuel.  Virgene Hashimoto, MD  d: 01/25/2018 12:13:13  t: 01/25/2018 12:16:36  Job [id number]  [facility]/

## 2018-01-25 NOTE — PROGRESS NOTES
[facility]    Progress Note    Lidya Spencer Patient Status:  Inpatient    1927 MRN [id number]   Location [facility] 3W/SW Attending Brook Spivey MD   Hosp Day # 6 PCP Baljinder Estevez MD       Subjective:     No chest p asa  - LDL 62, HDL 47  - carotid u/s no sig stenosis  - PT/OT consulted  - CT head - no acute finding. Chronic microangiopathy and large vessel atherosclerosis.  Nonspecific fusiform enlargement of the L>R optic nerves  - MRI acute cerebellar CVA  - neuro c

## 2018-01-25 NOTE — PHYSICAL THERAPY NOTE
PHYSICAL THERAPY TREATMENT NOTE - INPATIENT    Room Number: 145/603-T       Presenting Problem: abnormal EKG  dizziness  chest heaviness   unsteady gait   cardiology and neuro team following pt.      Problem List  Principal Problem:    Abnormal EKG  Active back to sitting on the side of the bed?: None   How much help from another person does the patient currently need. ..   -   Moving to and from a bed to a chair (including a wheelchair)?: A Little   -   Need to walk in hospital room?: [address],[place]ar[address] strategies  /improved safety awareness    Goal #5   Current Status In progress   Goal #6     Goal #6  Current Status

## 2018-01-25 NOTE — PROGRESS NOTES
[facility]    Brief Cardiac Cath Note  Doreen Marcialin Patient Status:  Inpatient    1927 MRN [id number]   Location [facility] Attending Fortunato Hooper, [address] [facility] Day # 6 PCP Tiffany Magallon MD       C

## 2018-01-26 NOTE — PLAN OF CARE
NEUROLOGICAL - ADULT    • Remains free of injury related to seizure activity Adequate for Discharge    • Achieves maximal functionality and self care Adequate for Discharge          No new s/s of neurological decline. Neuro status remains WNL.      Yin Fox

## 2018-01-26 NOTE — PHYSICAL THERAPY NOTE
PHYSICAL THERAPY TREATMENT NOTE - INPATIENT    Room Number: 530/363-E       Presenting Problem: abnormal EKG  dizziness  chest heaviness   unsteady gait   cardiology and neuro team following pt.      Problem List  Principal Problem:    Abnormal EKG  Active at this time due to elevated BP . Pt left up in chair with family present. Family expressing to therapy concerns about d/c to home and being able to care for pt due to prolonged hosp stay.      Therapy discussing dc options for pt including  DAVIS  Vs During activity  O2 sats 95 %  HR 52   Post activity BP  173/62 ( 94 )   RN aware of elevated BP      AM-PAC '6-Clicks' INPATIENT SHORT FORM - BASIC MOBILITY  How much difficulty does the patient currently have. ..  -   Turning over in bed (including adju assistive device and supervision with improved tolerance and safety awareness    Goal #4   Current Status NA due to pt not feeling well and pending cath later today    Goal #5 Patient  And family to verbalize and return demonstration of therapy education f

## 2018-01-26 NOTE — OCCUPATIONAL THERAPY NOTE
Attempted to see pt PM for OT treatment; scheduled for a procedure this afternoon, will follow up asap

## 2018-01-26 NOTE — CM/SW NOTE
PT/Cate told SW that pt's daughter interested in caregiver resources. SW met w/ pt's dtr, explained and gave caregiver resources.      Zayda Steele, [address]

## 2018-01-26 NOTE — PRE-SEDATION ASSESSMENT
Pre-Procedure Sedation Assessment    Chief Complaint/Indication for procedure: positive stress test    History of snoring or sleep or apnea?    No    History of previous problems with anesthesia or sedation  No    Physical Findings:  Neck: nl ROM  CV: RRR n

## 2018-01-26 NOTE — PROGRESS NOTES
[facility]    Progress Note    Manuel Alvarado Patient Status:  Inpatient    1927 MRN [id number]   Location [facility] 3W/SW Attending Daphney Wolf MD   Hosp Day # 7 PCP Darrick Chrisotpher MD       Subjective:     No CP or S stenosis  - PT/OT consulted  - CT head - no acute finding. Chronic microangiopathy and large vessel atherosclerosis.  Nonspecific fusiform enlargement of the L>R optic nerves  - MRI acute cerebellar CVA  - neuro consult appreciated  - PMR recommends home wi

## 2018-01-26 NOTE — BRIEF PROCEDURE NOTE
[facility]    [facility] Cardiac Cath Procedure Note  Deadra Lax Patient Status:  Inpatient    1927 MRN [id number]   Location [facility] Attending Samuel Limon, [address] Day # 7 PCP Tianna Bailey,

## 2018-01-27 NOTE — PROGRESS NOTES
Patient arrived from cath lab with s/p PCI with right groin approach. Per Cath lab RN, she was unable to obtain right pedal pulse via doppler. This was a change from this writers morning assessment when patient had a +1 palpable pedal pulse.     When  P

## 2018-01-27 NOTE — PROGRESS NOTES
Nsg concerned for R foot circulation post cath    Temperature to feet cool but equal bilaterally. PT pulse easily Dopplered,  Note DP is faint but dopplered. CYNDEE calculated at 0.9. No pain, color change, numbness, tingling.

## 2018-01-27 NOTE — PROGRESS NOTES
[facility]    Cardiology - [facility]  Progress Note    Yulia Valdivia Patient Status:  Inpatient    1927 MRN [id number]   Location [facility] 3W/SW Attending Chris Gomes MD   Hosp Day # 8 PCP Leonce Seip, MD [place] TSH 4.07 01/06/2018   MG 1.7 (L) 01/27/2018   TROP 0.03 01/19/2018     TELE - sinus rhythm and sinus bradycardia    ------------------------------------------------------------------------------------------------------------------------------

## 2018-01-28 ENCOUNTER — PRIOR ORIGINAL RECORDS (OUTPATIENT)
Dept: OTHER | Age: 83
End: 2018-01-28

## 2018-01-28 NOTE — CM/SW NOTE
RN told DREW that pt is cleared to discharge today. DREW notified Lori Dodd from [facility] of pt's discharge.      Marco Luceros, [address]

## 2018-01-28 NOTE — PROGRESS NOTES
[facility]    Progress Note    Shelba Shoulders Patient Status:  Inpatient    1927 MRN [id number]   Location [facility] 3W/SW Attending Trista Tadeo MD   Hosp Day # 8 PCP Yeison Simon MD       Subjective:     No CP or S home health on dc     Dehydration  Hyponatremia  - Improved.   Off IVF.     Leukocytosis  - may be 2/2 recent steroids and or URI  - stable     HL  - cont lipitor    Hx of COPD     dvt proph:   ambulating     Code status:   Full     >35 minutes spent     C

## 2018-01-28 NOTE — PROGRESS NOTES
[facility]    Progress Note    Nooksack Reasons Patient Status:  Inpatient    1927 MRN [id number]   Location [facility] 3W/SW Attending Rose Marie Schulte MD   Hosp Day # 9 PCP Vinnie Nguyễn MD       Assessment and Plan: sodium  100 mg Oral BID   • aspirin  325 mg Oral Daily   • hydrochlorothiazide  25 mg Oral Daily   • lisinopril  10 mg Oral Daily       Results:     Recent Labs   Lab  01/26/18   0520  01/27/18   0528   RBC  4.00*  4.06*   HGB  11.8*  12.0*   HCT  34.8*  3

## 2018-01-28 NOTE — PLAN OF CARE
CARDIOVASCULAR - ADULT    • Maintains optimal cardiac output and hemodynamic stability Progressing    • Absence of cardiac arrhythmias or at baseline Progressing    Hemodynamically stable     NEUROLOGICAL - ADULT    • Achieves stable or improved neurologic

## 2018-01-28 NOTE — DISCHARGE SUMMARY
[facility]    Discharge Summary    Dara Kennedy Patient Status:  Inpatient    1927 MRN [id number]   Location [facility] 3W/SW Attending No att. providers found   [facility] Day # 9 PCP Jayro Ward MD     Date of Admission: Urinalysis unremarkable. Sodium 128, chloride 89, BUN and creatinine ratio of 20.3. CBC showed a white blood cell count of 16.5. Patient had a chest x-ray which showed no acute findings, emphysema changes.   EKG showed subtle changes in lead III and the tablet (75 mg total) by mouth daily. Quantity:  30 tablet  Refills:  11     lisinopril 10 MG Tabs  Commonly known as:  PRINIVIL,ZESTRIL  Start taking on:  1/29/2018      Take 1 tablet (10 mg total) by mouth daily.    Quantity:  30 tablet  Refills:  1

## 2018-01-29 NOTE — TELEPHONE ENCOUNTER
Pt's daughter called in to schedule a hospital follow up for pt's stent placement procedure. Pt is scheduled for 2/7 with Dr. Alex Siegel, pt's daughter would like to know if it is ok for pt to wait this long to see Dr. Alex Siegel.

## 2018-01-29 NOTE — PROGRESS NOTES
Initial Post Discharge Follow Up   Discharge Date: 1/28/18  Contact Date: 1/29/2018    Consent Verification:  Assessment Completed With: Patient  Other: Daughter Emma Beals received per patient?  verbal  HIPAA Verified?   Yes    Discharge Dx:   Malissa Carrel because he feels so winded when it happens, he has to lay still until it passes). • Do you have any pain since discharge?   no  • When you were leaving the hospital were your discharge instructions explained to you? yes  • Do you have any questions about patient, and orders reviewed and discussed. Any changes or updates to medications and or orders sent to PCP.

## 2018-01-29 NOTE — TELEPHONE ENCOUNTER
S/w patient and his daughter Leena Granados for TCM. He states that since hospitalization, he has episodes of being able to feel his heart beating/palpitating and that causes him to be winded.  His daughter stated that this lasts for about 1.5 hours and he just ha

## 2018-01-30 NOTE — TELEPHONE ENCOUNTER
Rescheduled TCM per Dr. Solange Dyer.   Spoke with patient daughter Anthony Aguillon  Rescheduled TCM 1/31

## 2018-01-31 PROBLEM — IMO0002 STROKE SYNDROME: Status: ACTIVE | Noted: 2018-01-01

## 2018-01-31 NOTE — PROGRESS NOTES
HPI:    Alphonse Carrizales is a 80year old male here today for hospital follow up.    He was discharged from Inpatient hospital[facility]  to Home   Admission Date: 1/18/18   Discharge Date: 1/28/18  Hospital Discharge Diagnoses (since 1/1/2018)     Acu Current Meds:    Current Outpatient Prescriptions on File Prior to Visit:  atorvastatin 80 MG Oral Tab Take 1 tablet (80 mg total) by mouth nightly. lisinopril 10 MG Oral Tab Take 1 tablet (10 mg total) by mouth daily.    Clopidogrel Bisulfate 75 MG Ora or double vision  HEENT: denies nasal congestion, sinus pain or ST  LUNGS: denies shortness of breath with exertion  CARDIOVASCULAR: denies chest pain on exertion or palpitations  GI: denies abdominal pain, denies heartburn, denies diarrhea  MUSCULOSKELETA least 3 times weekly will help to curb one's appetite, control weight and lead to better blood pressure control. Record blood pressures at home and bring readings to your next office visit to review.      No orders of the defined types were placed in this e

## 2018-02-01 ENCOUNTER — MYAURORA ACCOUNT LINK (OUTPATIENT)
Dept: OTHER | Age: 83
End: 2018-02-01

## 2018-02-01 ENCOUNTER — PRIOR ORIGINAL RECORDS (OUTPATIENT)
Dept: OTHER | Age: 83
End: 2018-02-01

## 2018-02-01 LAB
BUN: 9 MG/DL
CALCIUM: 8.8 MG/DL
CHLORIDE: 97 MEQ/L
CREATININE, SERUM: 0.63 MG/DL
GLUCOSE: 95 MG/DL
MAGNESIUM: 1.6 MG/DL
POTASSIUM, SERUM: 3.8 MEQ/L
SODIUM: 128 MEQ/L

## 2018-02-02 NOTE — TELEPHONE ENCOUNTER
Per Carlton/PT, pls call back Rose/PT supervisor. Request a verbal approval to resched appt for pt OT next wk.

## 2018-02-08 NOTE — TELEPHONE ENCOUNTER
Edith Person from [place] [id number] is requesting approval to reschedule OT eval to next week     Please advise.

## 2018-02-09 NOTE — TELEPHONE ENCOUNTER
LM on confidential [facility] voicemail with Dr. Carol Alonzo comment.

## 2018-02-13 NOTE — TELEPHONE ENCOUNTER
RN calling regarding Home care visit for wound care on buttuck area.  Pt. Needs to apply barrier ointment mixture, 2 part diaper rash cream and 1 part olive oil daily, if no improvement in 1 week, then hydrocolloid dressing will need to be applied and gusman

## 2018-02-21 NOTE — TELEPHONE ENCOUNTER
[facility] is requesting to extend PT for 2x a week for 1 week starting the week of 2/26/2018.

## 2018-03-05 NOTE — PROGRESS NOTES
[facility]    Cardiology Progress Note  [facility]    Regi Pritchard Patient Status:  Outpatient    1927 MRN [id number]   Location [facility] Attending No att. providers found  (H) 02/01/2018   CA 9.9 02/01/2018   ALB 3.9 01/06/2018   ALKPHO 68 01/06/2018   BILT 1.1 01/06/2018   TP 6.9 01/06/2018   AST 26 01/06/2018   ALT 18 01/06/2018   PTT 32.8 01/24/2018   INR 1.0 01/24/2018   TSH 4.07 01/06/2018   MG 1.6 (L) 01/28/2

## 2018-03-12 NOTE — TELEPHONE ENCOUNTER
pt called. Asking if he can have an appt to see RJM in April for routine eye exam.  LOV 5/2/17. Please advise. Thank you.

## 2018-03-13 NOTE — TELEPHONE ENCOUNTER
Abby TORRES, please see message below. Is our office filling out Placard form or should this be routed to PCP? Please advise Thank you.

## 2018-03-14 NOTE — TELEPHONE ENCOUNTER
Hi Dr. Lindle Schilder     Pt's daughter calling stating her dad's BP's the last 2 days have been elevated. Yesterday they were 172/80, 181/80 and then today it was 192/81. Pt denies any chest pain, headaches or ringing in the ears.   Pt does say he is

## 2018-03-14 NOTE — TELEPHONE ENCOUNTER
Vladimir Mora,       Pt's daughter calling stating her dad's BP's the last 2 days have been elevated.       Yesterday they were 172/80, 181/80 and then today it was 192/81.       Pt denies any chest pain, headaches or ringing in the ears.   Pt does say he is a b

## 2018-03-14 NOTE — TELEPHONE ENCOUNTER
Sent call to CRYSTAL Barrera [facility] pt's BP has been elevated -  172/80 or 181/80. Pls call. Thank you.

## 2018-03-14 NOTE — TELEPHONE ENCOUNTER
Have him take the lisinopril to 10 mg AM and [id number] mg  in PM and monitor BP over next few days and see me next week. UPDATE med list to reflect.

## 2018-03-14 NOTE — TELEPHONE ENCOUNTER
Reviewed Dr Tonya Pappas orders with pt who verbalized understanding and agreed with md plan. Scheduled follow up appt with Dr Tonya Pappas on 3/21/18 @ 4:10pm Beverly BANERJEE.

## 2018-03-15 NOTE — TELEPHONE ENCOUNTER
Spoke with Dtr, informed her of Dr. Tab Sotomayor recommendations. Dtr verbalized understanding. Error on my previous documentation. Daughter was asking about lisinopril, not amlodipine.

## 2018-03-15 NOTE — TELEPHONE ENCOUNTER
Agree with Dr. Chelle Hobson triage recommendations if BP still not controlled may see APN next week

## 2018-03-15 NOTE — TELEPHONE ENCOUNTER
Daughter calling to update Dr. HORN regarding BP. Dtr states after taking Lisinopril 10mg last night and 10mg this morning, Pt's BP is 171/101 (15 minutes ago) and 179/90 (1hr ago). Please also see acute encounter 3/14.     Dtr asking if she can give him an

## 2018-03-15 NOTE — TELEPHONE ENCOUNTER
s/w dtr, advised on seeing APN next week if BP is still elevated.    Future Appointments  Date Time Provider Stacey Catherine   3/21/2018 4:10 PM Karlie Boudreaux MD [facility]   4/10/2018 1:15 PM Lalo Morris MD [facility]   6/4/

## 2018-03-15 NOTE — TELEPHONE ENCOUNTER
I would not recommend 3 amlodipine pills  in 1 day. These numbers are better than previous. Can take 2 lisinopril tonite and take 1 in AM daily making it a daily dose of 30 mg. See me next week.

## 2018-03-19 NOTE — TELEPHONE ENCOUNTER
Spoke to pt, informed forms have been completed and ready to be picked up on first floor . Pt verbalized understanding and had no further questions at this time.

## 2018-03-20 PROBLEM — E78.2 MIXED HYPERLIPIDEMIA: Status: ACTIVE | Noted: 2018-01-01

## 2018-03-20 NOTE — PROGRESS NOTES
Carolina Lopze is a 80year old male. HPI:   1. Essential hypertension with goal blood pressure less than 130/85    Patient has been following low salt diet and has been taking anti-hypertensive prescriptions as prescribed.  Blood pressure has been checked and Chalazion of right lower eyelid 2012    OD, RLL   • Drusen (degenerative) of retina 2011    OS; \"hard drusen\"   • Floaters 7/29/2015   • Hypertensive retinopathy of left eye 2011, 07-    OS   • Other and unspecified hyperlipidemia    • Unspecified medications as prescribed and to continue to follow a low fat, low cholesterol diet as discussed. Discussed lifestyle modifications including reductions in dietary total and saturated fat and eating a diet rich in fruits and vegetables.  Discussed decreasin

## 2018-03-27 NOTE — PROGRESS NOTES
Lidya Spencer is a 80year old male. Patient presents with:  Ear Wax: both ears  Throat Problem: per pt he feels something stuck at his throat since january        HISTORY OF PRESENT ILLNESS    11/16/15 Here for evaluation ofbilathearing loss.  Patient feels t artery disease   • Cancer Father      Pancreatic cancer   • Cataracts Father    • Cataracts Sister    • Cataracts Brother    • Stroke Brother    • Diabetes Neg    • Glaucoma Neg    • Macular degeneration Neg      Past Medical History:   Diagnosis Date   • 97.6 °F (36.4 °C) (Tympanic)   Ht 5' 6\" (1.676 m)   Wt 116 lb (52.6 kg)   BMI 18.72 kg/m²     System Findings Details   Skin Normal Inspection - Normal. No suspicious lesions bruises or masses.    Constitutional Normal Overall appearance - Normal.   Head/F The patient tolerated the procedure well and was discharged in stable condition    Current Outpatient Prescriptions:   •  atorvastatin 20 MG Oral Tab, Take 0.5 tablets (10 mg total) by mouth nightly., Disp: 30 tablet, Rfl: 5  •  lisinopril 10 MG Oral Tab,

## 2018-03-29 NOTE — PROGRESS NOTES
HPI:    Patient ID: Marilu Vera is a 80year old male. HPI  Pleasant 80-year-old male presents as a new patient to me on referral from Mya Lewis . He is accompanied today by his daughter.   His chief complaint is painful toenails and has difficulty ca option of treatment is debridement. On this visit careful and complete debridement of all 9 toenails was accomplished without incident. I reduced nail, subungual debris, and some keratosis.   Upon debridement there is no ulceration or infection no open or

## 2018-04-12 NOTE — TELEPHONE ENCOUNTER
Pt stts the pharm told him that a request came from Dr Remberto Dsouza for Rx lisinopril  Pt stts this should come from PCP   Pt is asking to have a refill on RX lisinopril  Pt has about days left  Pt would like to straighten out that his RX request should be from [facility]

## 2018-04-14 NOTE — TELEPHONE ENCOUNTER
Pending Prescriptions Disp Refills    lisinopril 10 MG Oral Tab 60 tablet 0     Sig: Take one tablet (10mg) by mouth in am and another 10 mg in pm everyday. Refill approved per protocol.     Hypertensive Medications  Protocol Criteria:  · Appoin 01/06/2018   AGRATIO 1.2 11/07/2014   ANIONGAP 9 02/01/2018   OSMOCALC 263 (L) 02/01/2018

## 2018-04-16 NOTE — TELEPHONE ENCOUNTER
On call     -please advised patient daughter not to increase lisinopril to more than this already taking 3 tablets daily      Advise  patient to be seen by  JOHN ANDERSON -  PREFERABLY  IN  AM - PER   ADVICE  PER   DR OROSCO - SEE   COMMUNICATION 3/

## 2018-04-16 NOTE — TELEPHONE ENCOUNTER
Joselyn Mendez (on HIPAA) on Dr. Ariel Girard information and recommendations. Tawanda Mitchell verbalized understanding. She will call Dr Liliane Rosado office in the morning to schedule with the APN. Patient is taking lisinopril bid.  Tawanda Mitchell stated patient has no sympt

## 2018-04-16 NOTE — TELEPHONE ENCOUNTER
Action Requested: Summary for Provider     []  Critical Lab, Recommendations Needed  [x] Need Additional Advice  []   FYI    []   Need Orders  [] Need Medications Sent to Pharmacy  []  Other     SUMMARY: Patient's daughter Hersagusto Stark) called stating patient'

## 2018-04-17 NOTE — PROGRESS NOTES
Viry Rock is a 80year old male. Patient presents with:   Follow - Up    HPI:   Patient comes in today for a checkup he was thought to have elevated blood pressures because that home machine was reading blood pressures of 170s and lisinopril have been adjus hypertension 2007   • Vitreous floaters of right eye 2011    OD      Social History:  Smoking status: Former Smoker                                                              Packs/day: 0.00      Years: 0.00      Smokeless tobacco: Never Used indicates understanding of these issues and agrees to the plan. The patient is asked to return in 6 weeks.       Dorothey Dubin, APN  4/17/2018  2:02 PM

## 2018-04-18 NOTE — TELEPHONE ENCOUNTER
----- Message from BRIAN Ridley sent at 4/18/2018  8:35 AM CDT -----  Blood test shows low sodium level may be related to diuretic use so cut HCTZ to QOD, BNP is normal no HR CBC is also stable no anemia, recheck BMP in 1-2 wks

## 2018-04-23 NOTE — TELEPHONE ENCOUNTER
Per Dentist, pt has an appt tomorrow and pt stts that he is taking abx prior to his appt and dentist would like to know what kind of abx pt is taking and needs a med clearance to this pt.

## 2018-04-30 NOTE — TELEPHONE ENCOUNTER
S/W pt and palliative care order by Dr. Oneil Gip they will have consult on Thursday.  Call back if any questions

## 2018-04-30 NOTE — TELEPHONE ENCOUNTER
Daughter calling to find out why she received a call about putting pt in palliative care - it was the first time she was hearing if this - asking to talk to Gelacio Barber

## 2018-05-01 NOTE — TELEPHONE ENCOUNTER
LMTCB transfer to triage-- with daughter Alejandro Aguillon home, [facility] he thought hosp[facility], chart says palliative care, is coming Thursday 5/3 for evaluation.  He says he's eating, moving around, still has to catch his breath,

## 2018-05-14 PROBLEM — R63.4 WEIGHT LOSS: Status: ACTIVE | Noted: 2018-01-01

## 2018-05-14 NOTE — PROGRESS NOTES
Vonnie Jones is a 80year old male. HPI:   1. Essential hypertension with goal blood pressure less than 130/85    Patient has been following low salt diet and has been taking anti-hypertensive prescriptions as prescribed.  Blood pressure has been checked and of right lower eyelid 2012    OD, RLL   • Drusen (degenerative) of retina 2011    OS; \"hard drusen\"   • Floaters 7/29/2015   • Hypertensive retinopathy of left eye 2011, 07-    OS   • Other and unspecified hyperlipidemia    • Unspecified essential after a few bites. Can't tolerate ensure since it upsets his stomach somewhat. 3. Vitamin D deficiency    Vitamin D level is quite low. Continue pill  2000 units daily of vitamin D3 for life over the counter.       Recommended he get a shingles vaccine

## 2018-05-18 NOTE — TELEPHONE ENCOUNTER
Per pc w/ daughter - has FMLA form work for dad, [employer] packet emailed to her '[email or web address]'.  NK

## 2018-05-21 NOTE — TELEPHONE ENCOUNTER
From daughter Jay Dubois via email - FMLA form for her for Dr. Rae Pace + FCR + Signed release+ cc info for $25, paid, receipt mailed to pt. Logged for processing.  NK

## 2018-05-24 NOTE — TELEPHONE ENCOUNTER
Dr. Adrián Garg,     [facility] forms pending. PC to pts daughter. She works for [employer] quarters as a . Recent move from her office from [facility] to [facility]. She used to be able to go home at lunch and feed dad, help him with all ADLs.  Donovan Salomon

## 2018-05-30 NOTE — PROGRESS NOTES
Natasha Covarrubias is a 80year old male. HPI:   1. Essential hypertension with goal blood pressure less than 130/85    Patient has been following low salt diet and has been taking anti-hypertensive prescriptions as prescribed.  Blood pressure has been checked and of retina 2011    OS; \"hard drusen\"   • Floaters 7/29/2015   • Hypertensive retinopathy of left eye 2011, 07-    OS   • Other and unspecified hyperlipidemia    • Unspecified essential hypertension 2007   • Vitreous floaters of right eye 2011    OD low. Continue pill  2000 units daily of vitamin D3 for life over the counter. Recommended he get a shingles vaccine at [facility]. The patient indicates understanding of these issues and agrees to the plan. The patient is asked to return in 4 months.

## 2018-06-11 NOTE — TELEPHONE ENCOUNTER
Dr. Demetri Ashton,    Please sign off on form:  -Highlight the patient and hit \"Chart\" button. -In Chart Review, w/in the Encounter tab - click 1 time on the Telephone call encounter for 05/18/18.  Scroll down the telephone encounter.  -Click \"scan on\" cornelio

## 2018-06-12 NOTE — TELEPHONE ENCOUNTER
Form faxed to HR and emailed to daughter KIRILLthu Housekishore johnson Lm to confirm p/u or mail to ptHouston COTTO

## 2018-06-18 NOTE — TELEPHONE ENCOUNTER
Called patient, spoke with Wife (HIPAA verified). Reviewed results and APN 's recommendation to [facility]. No questions or concerns at this time.

## 2018-06-25 PROBLEM — I25.10 CORONARY ARTERY DISEASE INVOLVING NATIVE CORONARY ARTERY: Status: ACTIVE | Noted: 2018-01-01

## 2018-06-25 NOTE — PROGRESS NOTES
Regi Pritchard is a 80year old male. 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 He continues to lose weight.   He has a lot of kg/m²   Physical Exam   Cardiovascular:   Murmur heard. Pulmonary/Chest: He has no wheezes. He has no rales. Abdominal: He exhibits no distension. Musculoskeletal: He exhibits no edema.        Assessment   ASSESSMENT AND PLAN:     Problem List Items Ad

## 2018-06-29 NOTE — H&P
PRE-PROCEDURE UPDATE    HPI: Hoda Huang is a 80year old male. 5/29/1927. Patient presents for an Esophagogastroduodenoscopy.     ALLERGIES:   Amoxicillin                 Comment:Other reaction(s): AMOXICILLIN TRIHYDRATE  Penicillins                 Comm dysphagia. No other symptoms. PLAN:  No significant changes to history or exam except as outlined above.      Gastroscopy with possible dilation  --the planned endoscopic procedure, indications, risks, benefits and post-op precautions were discussed and

## 2018-06-29 NOTE — OPERATIVE REPORT
ESOPHAGOGASTRODUODENOSCOPY REPORT    Patient Name:  Diallo Cristobal Record #: [id number]  YOB: 1927  Date of Procedure: 6/29/2018    Referring physician: Tianna Bailey MD    Surgeon:  Bhavana Barnhart.  Bobby Rodriguez MD    Pre-op diagnosis: Pr

## 2018-06-29 NOTE — ANESTHESIA POSTPROCEDURE EVALUATION
Patient: Deepali Isabel    Procedure Summary     Date:  06/29/18 Room / Location:  [facility] ENDOSCOPY 01 / [facility] ENDOSCOPY    Anesthesia Start:  4953 Anesthesia Stop:      Procedure:  ESOPHAGOGASTRODUODENOSCOPY (EGD) (N/A ) Diagnosis:       Weight loss      Dysphagia

## 2018-08-17 NOTE — TELEPHONE ENCOUNTER
Jens Del Toro -[phone]  palliative care nurse from [facility], has been seeing pt since May. His health has been declining and family called her today asking for an urgent order for hospice.   Per cain, pt may only have a couple of days left    Dx:

## 2018-08-17 NOTE — TELEPHONE ENCOUNTER
Called Armida and gave VO, she stts thank you so much for responding with an order urgently.   Chart was sent previously to [facility] as an [facility]

## 2019-02-28 VITALS
DIASTOLIC BLOOD PRESSURE: 64 MMHG | BODY MASS INDEX: 19.13 KG/M2 | RESPIRATION RATE: 18 BRPM | SYSTOLIC BLOOD PRESSURE: 124 MMHG | HEIGHT: 66 IN | HEART RATE: 78 BPM | WEIGHT: 119 LBS

## 2021-02-18 NOTE — CONSULTS
Problem: OXYGENATION/RESPIRATORY FUNCTION  Goal: Patient will maintain patent airway  Outcome: Ongoing  Goal: Patient will achieve/maintain normal respiratory rate/effort  Description: Respiratory rate and effort will be within normal limits for the patient  Outcome: Ongoing     Problem: IP MOBILITY  Goal: LTG - patient will demonstrate kitchen mobility  Outcome: Ongoing  Goal: LTG - patient will ambulate community distance  Outcome: Ongoing  Goal: LTG - patient will ambulate household distance  Outcome: Ongoing  Goal: LTG - Patient will navigate 4-6 steps with rails/device  Outcome: Ongoing  Goal: LTG - patient will demonstrate safe mobility requirements  Outcome: Ongoing  Goal: STG - Patient will ambulate  Outcome: Ongoing     Problem: Falls - Risk of:  Goal: Will remain free from falls  Description: Will remain free from falls  Outcome: Ongoing  Goal: Absence of physical injury  Description: Absence of physical injury  Outcome: Ongoing [facility]    Report of Consultation    Hoda Miguelalex Patient Status:  Inpatient    1927 MRN [id number]   Location [facility] 3W/SW Attending Wallis Lombard, MD   Hosp Day # 1 PCP Hafsa Vasquez MD     Date of Admission:  / • Chalazion of left lower eyelid 2011    OS, LLL   • Chalazion of right lower eyelid 2012    OD, RLL   • Drusen (degenerative) of retina 2011    OS; \"hard drusen\"   • Floaters 7/29/2015   • Hypertensive retinopathy of left eye 2011, 07-    OS Q6H PRN   0.9%  NaCl infusion  Intravenous Continuous       Prescriptions Prior to Admission:  hydrochlorothiazide 25 MG Oral Tab TAKE 1 TABLET(25 MG) BY MOUTH EVERY DAY   predniSONE 10 MG Oral Tab Take 3 pills daily PO daily x 3 days,Take 2 pills daily PO symmetrically. XI. Shoulder shrug is intact  XII.  Tongue is midline    Motor Exam:  Muscle tone normal  No atrophy or fasciculations  Strength- upper extremities 5/5 proximally and distally                  - lower  extremities 5/5 proximally and distally

## (undated) DEVICE — Device: Brand: DEFENDO AIR/WATER/SUCTION AND BIOPSY VALVE

## (undated) DEVICE — ENDOSCOPY PACK UPPER: Brand: MEDLINE INDUSTRIES, INC.

## (undated) DEVICE — FORCEP RADIAL JAW 4

## (undated) NOTE — MR AVS SNAPSHOT
[facility]  [address]  [phone]               Thank you for choosing us for your health care visit with Ashley Hall MD.  We are glad to serve you and happy to provide you with this summa flashes of light, loss of vision or curtain or veil effect.               Allergies as of May 02, 2017     Amoxicillin     Other reaction(s): AMOXICILLIN TRIHYDRATE    Penicillins     Other reaction(s): PENICILLIN G POTASSIUM                   Current Medic

## (undated) NOTE — LETTER
[address], [place]  Authorization for Invasive Procedures  1.  I hereby authorize Dr. Laurie Khanna , my physician and whomever may be designated as the doctor's assistant, to perform the following operation and/or procedure: Cardiac ca the event that I wish to have autologous transfusions of my own blood, or a directed donor transfusion, I will discuss this with my physician.      5. I consent to the photographing of the operations or procedures to be performed for the purposes of advanci Responsible person in case of minor or unconscious: _____________________________Relationship: ____________     Witness Signature: ____________________________________________ Date: __________ Time: ___________    Statement of Physician  My signature below

## (undated) NOTE — LETTER
4/4/2018              Zachary Arvizu        [address] [facility] [address]         Dear Luis Delarosa records indicate that the tests ordered for you by Matt Hammond MD  have not been done.   If you have, in fact, already completed the tests or you

## (undated) NOTE — MR AVS SNAPSHOT
[address] [facility] [address] [phone]               Thank you for choosing us for your health care visit with Matt Hammond MD.  We are glad to serve you and happy to provide you with this summary of y Support Staff. Remember, [employer] is NOT to be used for urgent needs. For medical emergencies, dial 911. Visit [email or web address] to learn more.            Visit [facility] online at  [email or web address]

## (undated) NOTE — LETTER
[address], [place]  Authorization for Invasive Procedures  1.  I hereby authorize Dr. Rae Smith , my physician and whomever may be designated as the doctor's assistant, to perform the following operation and/or procedure: Cardiac cath the event that I wish to have autologous transfusions of my own blood, or a directed donor transfusion, I will discuss this with my physician.      5. I consent to the photographing of the operations or procedures to be performed for the purposes of advanci Responsible person in case of minor or unconscious: _____________________________Relationship: ____________     Witness Signature: ____________________________________________ Date: __________ Time: ___________    Statement of Physician  My signature below

## (undated) NOTE — MR AVS SNAPSHOT
[facility] [place] [address] [address] [id number] [phone]               Thank you for choosing us for your health care visit with Brice Rivas MD.  We are glad to serve you and happy to provide you with this summar Visit [facility] online at  [email or web address]

## (undated) NOTE — LETTER
June 20, 2018     Samm Espinal  [address] [place] [address]      Dear Magdiel Bodily:  Lipids and kidney function good.     Below are the results from your recent visit:    Resulted Orders   BASIC METABOLIC PANEL (8)   Result Value Ref Range    Glucose 108 (H

## (undated) NOTE — MR AVS SNAPSHOT
[facility]  [address]  [phone]               Thank you for choosing us for your health care visit with Jens Rodriguez MD.  We are glad to serve you and happy to provide you with this summary Support Staff. Remember, [employer] is NOT to be used for urgent needs. For medical emergencies, dial 911. Visit [email or web address] to learn more.         Educational Information     Healthy Diet and Regular Exercise  [facility]

## (undated) NOTE — LETTER
March 29, 2018         Claire Cote MD  [address] [place] [address]      Patient: Raven Mclean   YOB: 1927   Date of Visit: 3/29/2018       Dear Dr. Jena Lin MD,    I saw your patient, Raven Mclean, on 3/29/2018.  Enclose

## (undated) NOTE — LETTER
6/8/2018              Zachary Arvizu        [address] [facility] [address]         Dear Alison Lights records indicate that the tests ordered for you by Satnam Fierro MD  have not been done.   If you have, in fact, already completed the tests or you